# Patient Record
Sex: FEMALE | Employment: UNEMPLOYED | URBAN - METROPOLITAN AREA
[De-identification: names, ages, dates, MRNs, and addresses within clinical notes are randomized per-mention and may not be internally consistent; named-entity substitution may affect disease eponyms.]

---

## 2020-11-06 ENCOUNTER — TRANSFERRED RECORDS (OUTPATIENT)
Dept: HEALTH INFORMATION MANAGEMENT | Facility: CLINIC | Age: 50
End: 2020-11-06

## 2020-12-22 ENCOUNTER — TRANSFERRED RECORDS (OUTPATIENT)
Dept: HEALTH INFORMATION MANAGEMENT | Facility: CLINIC | Age: 50
End: 2020-12-22

## 2021-05-18 ENCOUNTER — DOCUMENTATION ONLY (OUTPATIENT)
Dept: CARE COORDINATION | Facility: CLINIC | Age: 51
End: 2021-05-18

## 2021-05-19 ENCOUNTER — TELEPHONE (OUTPATIENT)
Dept: DERMATOLOGY | Facility: CLINIC | Age: 51
End: 2021-05-19

## 2021-05-19 ENCOUNTER — OFFICE VISIT (OUTPATIENT)
Dept: DERMATOLOGY | Facility: CLINIC | Age: 51
End: 2021-05-19
Payer: MEDICAID

## 2021-05-19 DIAGNOSIS — L40.0 PSORIASIS VULGARIS: Primary | ICD-10-CM

## 2021-05-19 PROCEDURE — 99204 OFFICE O/P NEW MOD 45 MIN: CPT | Performed by: DERMATOLOGY

## 2021-05-19 RX ORDER — LEVOTHYROXINE SODIUM 25 UG/1
1 TABLET ORAL DAILY
COMMUNITY

## 2021-05-19 RX ORDER — TRIAMCINOLONE ACETONIDE 5 MG/G
CREAM TOPICAL PRN
COMMUNITY
Start: 2021-04-27 | End: 2024-05-24

## 2021-05-19 RX ORDER — CALCIPOTRIENE 50 UG/G
CREAM TOPICAL PRN
COMMUNITY
Start: 2021-02-27 | End: 2024-05-24

## 2021-05-19 RX ORDER — TRIAMCINOLONE ACETONIDE 1 MG/G
CREAM TOPICAL
Qty: 454 G | Refills: 11 | Status: SHIPPED | OUTPATIENT
Start: 2021-05-19 | End: 2024-05-24

## 2021-05-19 RX ORDER — LANCETS 33 GAUGE
EACH MISCELLANEOUS PRN
COMMUNITY
Start: 2020-11-06

## 2021-05-19 RX ORDER — TACROLIMUS 0.1 %
OINTMENT (GRAM) TOPICAL PRN
COMMUNITY
Start: 2020-11-18 | End: 2022-02-21

## 2021-05-19 RX ORDER — TRIAMCINOLONE ACETONIDE 1 MG/G
OINTMENT TOPICAL
Qty: 454 G | Refills: 11 | Status: SHIPPED | OUTPATIENT
Start: 2021-05-19 | End: 2024-05-24

## 2021-05-19 RX ORDER — CLOBETASOL PROPIONATE 0.5 MG/ML
SOLUTION TOPICAL DAILY
COMMUNITY
Start: 2021-04-27 | End: 2021-08-24

## 2021-05-19 RX ORDER — BLOOD-GLUCOSE METER
EACH MISCELLANEOUS PRN
COMMUNITY
Start: 2020-11-06 | End: 2024-05-24

## 2021-05-19 RX ORDER — LEVOTHYROXINE SODIUM 200 UG/1
TABLET ORAL DAILY
COMMUNITY
Start: 2021-04-27

## 2021-05-19 ASSESSMENT — PAIN SCALES - GENERAL: PAINLEVEL: NO PAIN (0)

## 2021-05-19 NOTE — TELEPHONE ENCOUNTER
[facility] Call Center    Phone Message    May a detailed message be left on voicemail: yes     Reason for Call: Medication Question or concern regarding medication   Prescription Clarification  Name of Medication: triamcinolone (KENALOG) 0.1 % external ointment &   triamcinolone (KENALOG) 0.1 % external cream  Prescribing Provider: Dr. Garland   Pharmacy: [facility] [address] [place] [address] [address] [facility]   What on the order needs clarification? Pharmacy wanted to confirm for both Rx large tub 1 for ointment and 1 for cream. Please confirm do you want both or one or the other. Please call Pharmacy to discuss. Thank you          Action Taken: Message routed to:  [facility] Derm    Travel Screening: Not Applicable

## 2021-05-19 NOTE — PATIENT INSTRUCTIONS
For the scalp...  1. Can try over-the-counter T-gel shampoo (Neutrogena shampoo)  2. Continue clobetasol 0.05% solution    For body..  1. Use triam 0.1% cream in the morning and ointment in the eveing.     Will submit to insurance for approval of Humira (adalimumab).

## 2021-05-19 NOTE — PROGRESS NOTES
[facility] Dermatology Note  Encounter Date: May 19, 2021  Office Visit     Dermatology Problem List:  1. Psoriasis vulgaris without PsA  - prior tx: clobetasol 0.05% ointment, calcipotriene, protopic    ____________________________________________    Assessment & Plan:     # Psoriasis vulgaris +/- PsA  * Reviewed prior external note(s): Outside records from [facility]  * Reviewed the result(s) of each unique test: Dermatopathology report from 11/6/2020, CBC, CMP, Hep B/C, and quant gold from 11/17/2020.    - Plan to start Humira pending insurance approval  - Scalp: clobetasol 0.05% solution, T-gel shampoo  - Trunk/extremities: triam 0.1% cream in AM, triam 0.1% ointment in PM  - Face: Protopic 0.1% ointment  - Follow-up with rheumatologist for joint symptoms    Procedures Performed:   None    Follow-up: 3 month(s) virtually (telephone with photos), or earlier for new or changing lesions    Staff:     Gavin Garland MD  Pronouns: he/him/his    Department of Dermatology  [facility]: Phone: [phone], Fax:[phone]  [facility]: Phone: [phone] Fax: [phone]    ____________________________________________    CC: No chief complaint on file.    HPI:  Ms. Jessica Tapia is a(n) 50 year old female who presents today as a new patient for psoriasis. She reports a roughly 10+ year history of psoriasis. This initially started as a few spots on the trunk, but over the years has spread considerably with involvement on extremities, scalp. She does report some associated joint pains and is seeing a rheumatologist in [place]. She recently presented to a dermatologist in [place] with plan to start topical therapies and Skyrizi. They did perform a biopsy to confirm the diagnosis. Tasia unfortunately was not approved by her insurance. She has since using  only topical therapies but states these have not been effective, particularly on the arms. These including clobetasol 0.05% solution for the scalp, triam 0.1% cream and calcipotriene for the body, and protopic 0.1% ointment for the face. Patient otherwise has a history of Grave's disease on levothyroxine. She does not take any other medications regularly.     Patient is otherwise feeling well, without additional skin concerns.     Labs Reviewed:  N/A    Physical Exam:  Vitals: There were no vitals taken for this visit.  SKIN: Full skin, which includes the head/face, both arms, chest, back, abdomen,both legs, genitalia and/or groin buttocks, digits and/or nails, was examined.  - Numerous pink, psoriasiform appearing scaly plaques on bilateral upper and lower extremities, scalp, ~30% BSA involvement.   - No joint deformities.    - No other lesions of concern on areas examined.     Medications:  No current outpatient medications on file.     No current facility-administered medications for this visit.       Past Medical History:   There is no problem list on file for this patient.    No past medical history on file.

## 2021-05-19 NOTE — LETTER
5/19/2021       RE: Jessica Tapia  [address] [place] [address]     Dear Colleague,    Thank you for referring your patient, Jessica Tapia, to the [facility]ERMATOLOG[facility] CLINIC [place] at [facility]. Please see a copy of my visit note below.    [facility] Dermatology Note  Encounter Date: May 19, 2021  Office Visit     Dermatology Problem List:  1. Psoriasis vulgaris without PsA  - prior tx: clobetasol 0.05% ointment, calcipotriene, protopic    ____________________________________________    Assessment & Plan:     # Psoriasis vulgaris +/- PsA  * Reviewed prior external note(s): Outside records from [facility]  * Reviewed the result(s) of each unique test: Dermatopathology report from 11/6/2020, CBC, CMP, Hep B/C, and quant gold from 11/17/2020.    - Plan to start Humira pending insurance approval  - Scalp: clobetasol 0.05% solution, T-gel shampoo  - Trunk/extremities: triam 0.1% cream in AM, triam 0.1% ointment in PM  - Face: Protopic 0.1% ointment  - Follow-up with rheumatologist for joint symptoms    Procedures Performed:   None    Follow-up: 3 month(s) virtually (telephone with photos), or earlier for new or changing lesions    Staff:     Gavin Garland MD  Pronouns: he/him/his    Department of Dermatology  [facility]: Phone: [phone], Fax:[phone]  [facility]: Phone: [phone] Fax: [phone]    ____________________________________________    CC: No chief complaint on file.    HPI:  Ms. Jessica Tapia is a(n) 50 year old female who presents today as a new patient for psoriasis. She reports a roughly 10+ year history of psoriasis. This initially started as a few spots on the trunk, but over the years has spread considerably with involvement on extremities, scalp. She does  report some associated joint pains and is seeing a rheumatologist in [place]. She recently presented to a dermatologist in [place] with plan to start topical therapies and Skyrizi. They did perform a biopsy to confirm the diagnosis. Skyrizi unfortunately was not approved by her insurance. She has since using only topical therapies but states these have not been effective, particularly on the arms. These including clobetasol 0.05% solution for the scalp, triam 0.1% cream and calcipotriene for the body, and protopic 0.1% ointment for the face. Patient otherwise has a history of Grave's disease on levothyroxine. She does not take any other medications regularly.     Patient is otherwise feeling well, without additional skin concerns.     Labs Reviewed:  N/A    Physical Exam:  Vitals: There were no vitals taken for this visit.  SKIN: Full skin, which includes the head/face, both arms, chest, back, abdomen,both legs, genitalia and/or groin buttocks, digits and/or nails, was examined.  - Numerous pink, psoriasiform appearing scaly plaques on bilateral upper and lower extremities, scalp, ~30% BSA involvement.   - No joint deformities.    - No other lesions of concern on areas examined.     Medications:  No current outpatient medications on file.     No current facility-administered medications for this visit.       Past Medical History:   There is no problem list on file for this patient.    No past medical history on file.

## 2021-05-19 NOTE — NURSING NOTE
Dermatology Rooming Note    Jessica Tapia's goals for this visit include:   Chief Complaint   Patient presents with     Psoriasis     Jessica is here today for psoriasis. She states that her insurance declined the last medication she was prescribed. She would like to discuss further options.     Jese Delaney, EMT

## 2021-05-21 ENCOUNTER — TELEPHONE (OUTPATIENT)
Dept: DERMATOLOGY | Facility: CLINIC | Age: 51
End: 2021-05-21

## 2021-05-21 NOTE — TELEPHONE ENCOUNTER
[facility] Call Center    Phone Message    May a detailed message be left on voicemail: yes     Reason for Call: Other: Pt needs AVS mailed to her. Never received it     Action Taken: Message routed to:  [facility] Derm    Travel Screening: Not Applicable

## 2021-05-24 DIAGNOSIS — L40.0 PSORIASIS VULGARIS: ICD-10-CM

## 2021-05-24 NOTE — TELEPHONE ENCOUNTER
[facility] Call Center    Phone Message    May a detailed message be left on voicemail: no     Reason for Call: Medication Refill Request    Has the patient contacted the pharmacy for the refill? Yes     Name of medication being requested:   adalimumab (HUMIRA *CF*) 80 MG/0.8ML pen kit  adalimumab (HUMIRA *CF*) 40 MG/0.4ML pen kit     Provider who prescribed the medication:   Dr aGrland    Pharmacy:   [facility] - [place] [address]     Date medication is needed: ASAP   Pt aware of 72-business hour turn around time on refill requests      Action Taken: Message routed to:  [facility] Derm    Travel Screening: Not Applicable     Pt called pharmacy. Pharmacy states that the 2 Rx have not been received by them.    Please resend the Rx to the pharmacy to be filled.    Thanks!

## 2021-05-24 NOTE — TELEPHONE ENCOUNTER
adalimumab (HUMIRA *CF*) 80 MG/0.8ML pen kit     Last Written Prescription Date:  5/19/2021  Last Fill Quantity: 0.8,   # refills: 0  Last Office Visit : 5/19/2021  Future Office visit:  8/24/2021  Routing refill request to provider for review/approval because:  Drug not on the [facility], [facility] or [facility] refill protocol or controlled substance    adalimumab (HUMIRA *CF*) 40 MG/0.4ML pen kit  Last Written Prescription Date:  5/19/2021  Last Fill Quantity: 0.8,   # refills: 2  Last Office Visit : 5/19/2021  Future Office visit:  8/24/2021  Routing refill request to provider for review/approval because:  Drug not on the [facility], [facility] or [facility] refill protocol or controlled substance    Melia Mauro RN  Central Triage Red Flags/Med Refills

## 2021-05-25 NOTE — TELEPHONE ENCOUNTER
Received refill request as the resident on call. Spoke with [facility] who states they have not received the prescriptions sent 6 days ago by Dr. Garland. I will resend the prescription.    Routed as AIMEEI.

## 2021-06-22 ENCOUNTER — TELEPHONE (OUTPATIENT)
Dept: DERMATOLOGY | Facility: CLINIC | Age: 51
End: 2021-06-22

## 2021-06-22 DIAGNOSIS — L40.9 PSORIASIS: Primary | ICD-10-CM

## 2021-06-22 NOTE — TELEPHONE ENCOUNTER
Received a fax from [employer] regarding Humira Pen 40mg/0.4mL.  [facility] will provide the medication on behalf of [employer] at no cost upon receipt of a valid prescription.    Please fax script to [employer] at [phone]

## 2021-06-22 NOTE — TELEPHONE ENCOUNTER
PA team cannot fax scripts to pharmacy's.  Please route appropriately.  If a PA is needed please route back to PA team.

## 2021-06-29 NOTE — TELEPHONE ENCOUNTER
[facility] Call Center    Phone Message    May a detailed message be left on voicemail: yes     Reason for Call: Medication Question or concern regarding medication   Prescription Clarification  Name of Medication: adalimumab (HUMIRA *CF*) 40 MG/0.4ML pen kit  Prescribing Provider: Dr. Garland   Pharmacy: [facility] - [place], [place] - [address]   What on the order needs clarification? Pt needs a replacement shot. Please call Pt back to discuss. Pt had a mistake dose and needs a replacement shot. Thank you          Action Taken: Message routed to:  [facility] Derm    Travel Screening: Not Applicable

## 2021-07-02 NOTE — TELEPHONE ENCOUNTER
Called and spoke with pt, pt states she was trying to do a test run with the but it was the real shot. Pt states she spoke with someone from the [facility] and they stated that they would send her a replacement free of charge.     Also called and spoke with [facility]specialty pharmacy and they stated that a replacement shot will be sent out to the pt on 7-7-21.  No further action needed at this time.    Lakesha Hayes MA

## 2021-08-24 ENCOUNTER — VIRTUAL VISIT (OUTPATIENT)
Dept: DERMATOLOGY | Facility: CLINIC | Age: 51
End: 2021-08-24
Payer: MEDICAID

## 2021-08-24 DIAGNOSIS — L40.0 PSORIASIS VULGARIS: Primary | ICD-10-CM

## 2021-08-24 DIAGNOSIS — L40.50 PSORIATIC ARTHRITIS (H): ICD-10-CM

## 2021-08-24 DIAGNOSIS — L40.9 PSORIASIS: ICD-10-CM

## 2021-08-24 PROCEDURE — 99442 PR PHYSICIAN TELEPHONE EVALUATION 11-20 MIN: CPT | Performed by: DERMATOLOGY

## 2021-08-24 RX ORDER — CLOBETASOL PROPIONATE 0.5 MG/ML
SOLUTION TOPICAL
Qty: 50 ML | Refills: 11 | Status: SHIPPED | OUTPATIENT
Start: 2021-08-24 | End: 2024-05-24

## 2021-08-24 ASSESSMENT — PAIN SCALES - GENERAL: PAINLEVEL: NO PAIN (0)

## 2021-08-24 NOTE — LETTER
8/24/2021       RE: Jessica Tapia  [address] [place] [address]     Dear Colleague,    Thank you for referring your patient, Jessica Tapia, to the [facility] DERMATOLOGY CLINIC [place] at [facility]. Please see a copy of my visit note below.    [facility] Dermatology Note  Encounter Date: Aug 24, 2021  Store[facility]Forward and Telephone ([phone]). Location of teledermatologist: [facility]MAT[facility]INIC [place].  Start time: 5:02 PM. End time: 5:13 PM.    Dermatology Problem List:  1. Psoriasis vulgaris without PsA  - Humira started 5/21, clobetasol 0.05% solution (scalp), triam 01.% cream or ointment (trunk), protopic 0.1% ointment (face)  - prior tx: clobetasol 0.05% ointment, calcipotriene, protopic  ____________________________________________    Assessment & Plan:     # Psoriasis vulgaris. Chronic, stable.   # Psoriatic arthritis. Chronic, stable.   - Continue Humira 40 mg [id number]; discuss maximal efficacy can take 3-6 months. Recommended re-evaluation in 3 months. If skin or joint pains not improved, consider switch to IL-17 inhibitor.   - Scalp: clobetasol 0.05% solution, T-gel shampoo  - Trunk/extremities: triam 0.1% cream in AM, triam 0.1% ointment in PM  - Face: Protopic 0.1% ointment    Procedures Performed:    None    Follow-up: 3 month(s) virtually (telephone with photos), or earlier for new or changing lesions    Staff:     Scribe Disclosure:  HUGH, Mee Whiteside, am serving as a scribe to document services personally performed by Gavin Garland MD based on data collection and the provider's statements to me.     Provider Disclosure:   The documentation recorded by the scribe accurately reflects the services I personally performed and the decisions made by me.    Gavin Garland MD    Department of Dermatology  [facility]:  Phone: [phone], Fax:[phone]  [facility]: Phone: [phone] Fax: [phone]    ____________________________________________    CC: Follow-up psoriasis.     HPI:  Ms. Jessica Tapia is a(n) 51 year old female who presents today as a return patient for psoriasis vulgaris. The patient was last seen in dermatology by myself on 5/19/21 at which time the patient was continued on topical regimens for treatment of psoriasis affecting the face, trunk/extremities, and scalp. Additionally, we planned to start Humira pending insurance approval.     Today, patient reports psoriasis is improved but not resolved from prior. She has been taking Humira for about 2 months and has noticed significant decreased in pruritus and some thinning of psoriasis plaques, though does continue to have extensive disease. Still has mild joint symptoms. She is not using topicals regularly.     Patient is otherwise feeling well, without additional skin concerns.    Labs Reviewed:  N/A    Physical Exam:  Vitals: There were no vitals taken for this visit.  SKIN: Teledermatology photos were reviewed; image quality and interpretability: acceptable. Image date: 8/24/21.  - Numerous pink, psoriasiform appearing plaques with overlying micaceous scale on extremities; appears slightly improved from prior.   - No other lesions of concern on areas examined.         Medications:  Current Outpatient Medications   Medication     adalimumab (HUMIRA *CF*) 40 MG/0.4ML pen kit     adalimumab (HUMIRA *CF*) 40 MG/0.4ML pen kit     adalimumab (HUMIRA *CF*) 80 MG/0.8ML pen kit     blood glucose monitoring (ONE TOUCH ULTRA 2) meter device kit     calcipotriene (DOVONOX) 0.005 % external cream     clobetasol (TEMOVATE) 0.05 % external solution     Lancets (ONETOUCH DELICA PLUS [id number]) MISC     levothyroxine (SYNTHROID/LEVOTHROID) 200 MCG tablet     levothyroxine (SYNTHROID/LEVOTHROID) 25 MCG tablet      PROTOPIC 0.1 % external ointment     triamcinolone (ARISTOCORT HP) 0.5 % external cream     triamcinolone (KENALOG) 0.1 % external cream     triamcinolone (KENALOG) 0.1 % external ointment     No current facility-administered medications for this visit.      Past Medical/Surgical History:   There is no problem list on file for this patient.    No past medical history on file.

## 2021-08-24 NOTE — PROGRESS NOTES
[facility] Dermatology Note  Encounter Date: Aug 24, 2021  Store[facility]and-Forward and Telephone ([phone]). Location of teledermatologist: [facility] [place].  Start time: 5:02 PM. End time: 5:13 PM.    Dermatology Problem List:  1. Psoriasis vulgaris without PsA  - Humira started 5/21, clobetasol 0.05% solution (scalp), triam 01.% cream or ointment (trunk), protopic 0.1% ointment (face)  - prior tx: clobetasol 0.05% ointment, calcipotriene, protopic  ____________________________________________    Assessment & Plan:     # Psoriasis vulgaris. Chronic, stable.   # Psoriatic arthritis. Chronic, stable.   - Continue Humira 40 mg [id number]; discuss maximal efficacy can take 3-6 months. Recommended re-evaluation in 3 months. If skin or joint pains not improved, consider switch to IL-17 inhibitor.   - Scalp: clobetasol 0.05% solution, T-gel shampoo  - Trunk/extremities: triam 0.1% cream in AM, triam 0.1% ointment in PM  - Face: Protopic 0.1% ointment    Procedures Performed:    None    Follow-up: 3 month(s) virtually (telephone with photos), or earlier for new or changing lesions    Staff:     Scribe Disclosure:  Mee REESE, am serving as a scribe to document services personally performed by Gavin Garland MD based on data collection and the provider's statements to me.     Provider Disclosure:   The documentation recorded by the scribe accurately reflects the services I personally performed and the decisions made by me.    Gavin Garland MD    Department of Dermatology  [facility]: Phone: [phone], Fax:[phone]  [facility]: Phone: [phone] Fax: [phone]    ____________________________________________    CC: Follow-up psoriasis.     HPI:  Ms. Jessica Tapia is a(n) 51 year old female who presents today as a return patient  for psoriasis vulgaris. The patient was last seen in dermatology by myself on 5/19/21 at which time the patient was continued on topical regimens for treatment of psoriasis affecting the face, trunk/extremities, and scalp. Additionally, we planned to start Humira pending insurance approval.     Today, patient reports psoriasis is improved but not resolved from prior. She has been taking Humira for about 2 months and has noticed significant decreased in pruritus and some thinning of psoriasis plaques, though does continue to have extensive disease. Still has mild joint symptoms. She is not using topicals regularly.     Patient is otherwise feeling well, without additional skin concerns.    Labs Reviewed:  N/A    Physical Exam:  Vitals: There were no vitals taken for this visit.  SKIN: Teledermatology photos were reviewed; image quality and interpretability: acceptable. Image date: 8/24/21.  - Numerous pink, psoriasiform appearing plaques with overlying micaceous scale on extremities; appears slightly improved from prior.   - No other lesions of concern on areas examined.         Medications:  Current Outpatient Medications   Medication     adalimumab (HUMIRA *CF*) 40 MG/0.4ML pen kit     adalimumab (HUMIRA *CF*) 40 MG/0.4ML pen kit     adalimumab (HUMIRA *CF*) 80 MG/0.8ML pen kit     blood glucose monitoring (ONE TOUCH ULTRA 2) meter device kit     calcipotriene (DOVONOX) 0.005 % external cream     clobetasol (TEMOVATE) 0.05 % external solution     Lancets (ONETOUCH DELICA PLUS [id number]) MISC     levothyroxine (SYNTHROID/LEVOTHROID) 200 MCG tablet     levothyroxine (SYNTHROID/LEVOTHROID) 25 MCG tablet     PROTOPIC 0.1 % external ointment     triamcinolone (ARISTOCORT HP) 0.5 % external cream     triamcinolone (KENALOG) 0.1 % external cream     triamcinolone (KENALOG) 0.1 % external ointment     No current facility-administered medications for this visit.      Past Medical/Surgical History:   There is no problem list  on file for this patient.    No past medical history on file.

## 2021-10-22 ENCOUNTER — TELEPHONE (OUTPATIENT)
Dept: DERMATOLOGY | Facility: CLINIC | Age: 51
End: 2021-10-22

## 2021-10-22 NOTE — TELEPHONE ENCOUNTER
Spoke with pt and advised her to set up [employer] account. Activation code sent. I further advised patient to send in the photos via [employer]. Will touch base with Dr. Garland upon receipt.

## 2021-10-22 NOTE — TELEPHONE ENCOUNTER
[facility] Call Center    Phone Message    May a detailed message be left on voicemail: yes     Reason for Call: Other: Pt called and said that she sent some pictures of her psoriasis and would like a call back from the care team to discuss about it. Pt is currently having issues with her psoriasis.  Please call. Thanks    Action Taken: Message routed to:  [facility] DERM    Travel Screening: Not Applicable

## 2021-11-07 ENCOUNTER — HEALTH MAINTENANCE LETTER (OUTPATIENT)
Age: 51
End: 2021-11-07

## 2021-11-15 ENCOUNTER — VIRTUAL VISIT (OUTPATIENT)
Dept: DERMATOLOGY | Facility: CLINIC | Age: 51
End: 2021-11-15
Payer: MEDICAID

## 2021-11-15 DIAGNOSIS — L40.0 PSORIASIS VULGARIS: Primary | ICD-10-CM

## 2021-11-15 PROCEDURE — 99214 OFFICE O/P EST MOD 30 MIN: CPT | Mod: 95 | Performed by: DERMATOLOGY

## 2021-11-15 NOTE — PROGRESS NOTES
[facility] Dermatology Note  Encounter Date: Nov 15, 2021  Telephone ([phone]). Location of teledermatologist: [facility]. Start time: 1:45 PM. End time: 1:55 PM.    Dermatology Problem List:  1. Psoriasis vulgaris +/- PsA  - Humira started 5/21, clobetasol 0.05% solution (scalp), triam 01.% cream or ointment (trunk), protopic 0.1% ointment (face)  - prior tx: clobetasol 0.05% ointment, calcipotriene, protopic  ____________________________________________     Assessment & Plan:      # Psoriasis vulgaris +/- PsA. Chronic, flare.   - Plan to recheck quant gold at next follow-up visit.   - Will plan to switch to Skyrizi pending insurance approval.  - Continue humira [id number] for now  - Scalp: clobetasol 0.05% solution, T-gel shampoo  - Trunk/extremities: triam 0.1% cream in AM, triam 0.1% ointment in PM  - Face: Protopic 0.1% ointment    Procedures Performed:    None.    Follow-up: 3 month(s) virtually (telephone with photos), or earlier for new or changing lesions    Staff and Scribe:     Scribe Disclosure:   ILuisa, am serving as a scribe to document services personally performed by this physician, Dr. Gavin Garland, based on data collection and the provider's statements to me.     Provider Disclosure:   The documentation recorded by the scribe accurately reflects the services I personally performed and the decisions made by me.    Gavin Garland MD    Department of Dermatology  [facility]: Phone: [phone], Fax:[phone]  [facility]: Phone: [phone] Fax: [phone]    ____________________________________________    CC: Psoriasis (better than 3 weeks ago.)    HPI:  Ms. Jessica Tapia is a(n) 51 year old female who presents today as a return patient for psoriasis.    Last seen 8/24/21 virtually. Plan was to continue  Humira 40mg q 2 weeks. Topicals were also prescribed (clobetasol/ t-gel for scalp, triamcinolone for trunk/extremities, and protopic for face).    Today, patient notes recent flare of her psoriasis. She states lesions became more painful, tender. She stopped using topical therapies and then switched to vaseline only with some improvement. She continues on Humira. States joint pains are mildly worse than prior, but much better than prior to Humira.     Patient is otherwise feeling well, without additional skin concerns.    Labs Reviewed:  N/A    Physical Exam:  Vitals: There were no vitals taken for this visit.  SKIN: Teledermatology photos were reviewed; image quality and interpretability: acceptable. Image date: 11/15/21.  - Numerous well-demarcated psoriasiform appearing plaques on the lower extremities, upper extremities.   - No other lesions of concern on areas examined.                 Medications:  Current Outpatient Medications   Medication     adalimumab (HUMIRA *CF*) 40 MG/0.4ML pen kit     adalimumab (HUMIRA *CF*) 40 MG/0.4ML pen kit     adalimumab (HUMIRA *CF*) 80 MG/0.8ML pen kit     blood glucose monitoring (ONE TOUCH ULTRA 2) meter device kit     calcipotriene (DOVONOX) 0.005 % external cream     clobetasol (TEMOVATE) 0.05 % external solution     Lancets (ONETOUCH DELICA PLUS [id number]) MISC     levothyroxine (SYNTHROID/LEVOTHROID) 200 MCG tablet     levothyroxine (SYNTHROID/LEVOTHROID) 25 MCG tablet     PROTOPIC 0.1 % external ointment     Risankizumab-rzaa 150 MG/ML SOAJ     Risankizumab-rzaa 150 MG/ML SOAJ     triamcinolone (ARISTOCORT HP) 0.5 % external cream     triamcinolone (KENALOG) 0.1 % external cream     triamcinolone (KENALOG) 0.1 % external ointment     No current facility-administered medications for this visit.      Past Medical/Surgical History:   There is no problem list on file for this patient.    No past medical history on file.    Teledermatology Nurse Call Patients:   Chief  Complaint   Patient presents with     Psoriasis     better than 3 weeks ago.     Are you in the [facility] at the time of the encounter? yes    Today's visit will be billed to you and your insurance.    A teledermatology visit is not as thorough as an in-person visit and the quality of the photograph sent may not be of the same quality as that taken by the dermatology clinic.    Gavin Garland MD on 11/15/2021 at 1:14 PM

## 2021-11-15 NOTE — PATIENT INSTRUCTIONS
[facility] Dermatology Visit    Thank you for allowing us to participate in your care. Your findings, instructions and follow-up plan are as follows:     Psoriasis vulgaris.   1. Continue humira for now. Will plan to switch to Skyrizi pending insurance approval.     When should I call my doctor?    If you are worsening or not improving, please, contact us or seek urgent care as noted below.     Who should I call with questions (adults)?    [facility] (adult and pediatric): [phone]    [facility] (adult): [phone]    For urgent needs outside of business hours call the [facility] at [phone] and ask for the dermatology resident on call    If this is a medical emergency and you are unable to reach an ER, Call [phone]    Who should I call with questions (pediatric)?  [facility]- Pediatric Dermatology  Dr. Ashley Blanco, Dr. Anatoly Freire, Dr. Renuka Beckett, Jesi Gandara, PA  Dr. Gretchen Moreira, Dr. Candie Small & Dr. Yahir Delcid  Non Urgent  Nurse Triage Line; [phone]- bri and Sully RN Care Coordinators   Jenise (/Complex ) [phone]    If you need a prescription refill, please contact your pharmacy. Refills are approved or denied by our physicians during normal business hours, Monday through Fridays  Per office policy, refills will not be granted if you have not been seen within the past year (or sooner depending on your child's condition).    Scheduling Information:  Pediatric Appointment Scheduling and Call Center [phone]  Radiology Scheduling- [phone]  Sedation Unit Scheduling- [phone]  [facility] Scheduling- General [phone]; Pediatric Dermatology [phone]  Main  Services: [phone]  Bengali: [phone]  Bulgarian: [phone]  Hmong/Grupo/Bulgarian: [phone]  Preadmission Nursing Department Fax Number: [phone] (fax all pre-operative paperwork to this number)    For urgent matters arising during evenings, weekends, or holidays that cannot wait for normal business hours please call [phone] and ask for the dermatology resident on call to be paged.

## 2021-11-15 NOTE — LETTER
11/15/2021         RE: Jessica Tapia  [address] [facility] [address]        Dear Colleague,    Thank you for referring your patient, Jessica Tapia, to the [facility]. Please see a copy of my visit note below.    [facility] Dermatology Note  Encounter Date: Nov 15, 2021  Telephone ([phone]). Location of teledermatologist: [facility]. Start time: 1:45 PM. End time: 1:55 PM.    Dermatology Problem List:  1. Psoriasis vulgaris +/- PsA  - Humira started 5/21, clobetasol 0.05% solution (scalp), triam 01.% cream or ointment (trunk), protopic 0.1% ointment (face)  - prior tx: clobetasol 0.05% ointment, calcipotriene, protopic  ____________________________________________     Assessment & Plan:      # Psoriasis vulgaris +/- PsA. Chronic, flare.   - Plan to recheck quant gold at next follow-up visit.   - Will plan to switch to Skyrizi pending insurance approval.  - Continue humira [id number] for now  - Scalp: clobetasol 0.05% solution, T-gel shampoo  - Trunk/extremities: triam 0.1% cream in AM, triam 0.1% ointment in PM  - Face: Protopic 0.1% ointment    Procedures Performed:    None.    Follow-up: 3 month(s) virtually (telephone with photos), or earlier for new or changing lesions    Staff and Scribe:     Scribe Disclosure:   I, Luisa Hopkins, am serving as a scribe to document services personally performed by this physician, Dr. Gavin Garland, based on data collection and the provider's statements to me.     Provider Disclosure:   The documentation recorded by the scribe accurately reflects the services I personally performed and the decisions made by me.    Gavin Garland MD    Department of Dermatology  [facility]: Phone: [phone], Fax:[phone]  [facility]: Phone: [phone] Fax:  [phone]    ____________________________________________    CC: Psoriasis (better than 3 weeks ago.)    HPI:  Ms. Jessica Tapia is a(n) 51 year old female who presents today as a return patient for psoriasis.    Last seen 8/24/21 virtually. Plan was to continue Humira 40mg q 2 weeks. Topicals were also prescribed (clobetasol/ t-gel for scalp, triamcinolone for trunk/extremities, and protopic for face).    Today, patient notes recent flare of her psoriasis. She states lesions became more painful, tender. She stopped using topical therapies and then switched to vaseline only with some improvement. She continues on Humira. States joint pains are mildly worse than prior, but much better than prior to Humira.     Patient is otherwise feeling well, without additional skin concerns.    Labs Reviewed:  N/A    Physical Exam:  Vitals: There were no vitals taken for this visit.  SKIN: Teledermatology photos were reviewed; image quality and interpretability: acceptable. Image date: 11/15/21.  - Numerous well-demarcated psoriasiform appearing plaques on the lower extremities, upper extremities.   - No other lesions of concern on areas examined.                 Medications:  Current Outpatient Medications   Medication     adalimumab (HUMIRA *CF*) 40 MG/0.4ML pen kit     adalimumab (HUMIRA *CF*) 40 MG/0.4ML pen kit     adalimumab (HUMIRA *CF*) 80 MG/0.8ML pen kit     blood glucose monitoring (ONE TOUCH ULTRA 2) meter device kit     calcipotriene (DOVONOX) 0.005 % external cream     clobetasol (TEMOVATE) 0.05 % external solution     Lancets (ONETOUCH DELICA PLUS [id number]) MISC     levothyroxine (SYNTHROID/LEVOTHROID) 200 MCG tablet     levothyroxine (SYNTHROID/LEVOTHROID) 25 MCG tablet     PROTOPIC 0.1 % external ointment     Risankizumab-rzaa 150 MG/ML SOAJ     Risankizumab-rzaa 150 MG/ML SOAJ     triamcinolone (ARISTOCORT HP) 0.5 % external cream     triamcinolone (KENALOG) 0.1 % external cream     triamcinolone  (KENALOG) 0.1 % external ointment     No current facility-administered medications for this visit.      Past Medical/Surgical History:   There is no problem list on file for this patient.    No past medical history on file.    Teledermatology Nurse Call Patients:   Chief Complaint   Patient presents with     Psoriasis     better than 3 weeks ago.     Are you in the [facility] at the time of the encounter? yes    Today's visit will be billed to you and your insurance.    A teledermatology visit is not as thorough as an in-person visit and the quality of the photograph sent may not be of the same quality as that taken by the dermatology clinic.    Gavin Garland MD on 11/15/2021 at 1:14 PM        Again, thank you for allowing me to participate in the care of your patient.        Sincerely,        Gavin Garland MD

## 2021-11-16 ENCOUNTER — TELEPHONE (OUTPATIENT)
Dept: DERMATOLOGY | Facility: CLINIC | Age: 51
End: 2021-11-16
Payer: MEDICAID

## 2021-11-16 DIAGNOSIS — L40.0 PSORIASIS VULGARIS: Primary | ICD-10-CM

## 2021-11-16 NOTE — TELEPHONE ENCOUNTER
PA Initiation    Medication: Skyrizi  Insurance Company: [place] [employer] ([facility]) - Phone [phone] Fax [phone]  Pharmacy Filling the Rx: [facility] - [place], [place] - [facility] [address]  Filling Pharmacy Phone:    Filling Pharmacy Fax:    Start Date: 11/16/2021

## 2021-11-30 NOTE — TELEPHONE ENCOUNTER
I called insurance to check the status of the prior authorization, per the rep additional information was faxed to the clinic.  I completed the additional information form and faxed back to the insurance.    Emelia

## 2021-12-03 NOTE — TELEPHONE ENCOUNTER
" Health Call Center    Phone Message    May a detailed message be left on voicemail: yes     Reason for Call: Medication Question or concern regarding medication   Prescription Clarification  Name of Medication: Skyrizi  Prescribing Provider: Dr. Garland   Pharmacy: [facility] - [place], [place] - [address]    What on the order needs clarification? Pt was told be Pharmacy \"They are waiting for Dr. Garland to fill out some paper work for the Rx. Please call Pharmacy to discuss. Thanks          Action Taken: Message routed to:  [facility] Derm    Travel Screening: Not Applicable                                                                      "

## 2021-12-07 NOTE — TELEPHONE ENCOUNTER
Called [facility] was informed the PA was denied and the letter was sent to the pharmacy.  Rep stated she was not able to send me the letter and advised we call the pharmacy.  -sent message to [facility] to see if she is able to find the denial letter

## 2021-12-07 NOTE — TELEPHONE ENCOUNTER
PRIOR AUTHORIZATION DENIED    Medication: Skyrizi - Denied    Denial Date: 12/3/2021    Denial Rational:       Appeal Information:

## 2021-12-10 NOTE — TELEPHONE ENCOUNTER
Spoke with patient and informed of denial and options going forward. Patient would like to look up more information on the options and contact the clinic back with her choice. Patient was offered telephone visit with Dr. Garland to discuss options as well to which she will let us know when she calls back if she would like a telephone visit.     Nicolette Clifford LPN

## 2021-12-15 NOTE — TELEPHONE ENCOUNTER
I spoke with this patient  and states she was already told about the denial and sent a mychart stating she would like to try the Otezla. RN sent a message to  with this update..Thuy Ulloa RN

## 2021-12-16 ENCOUNTER — TELEPHONE (OUTPATIENT)
Dept: DERMATOLOGY | Facility: CLINIC | Age: 51
End: 2021-12-16
Payer: MEDICAID

## 2021-12-16 NOTE — TELEPHONE ENCOUNTER
Prior Authorization Not Needed per Insurance    Medication: Otelza  Insurance Company:    Expected CoPay:      Pharmacy Filling the Rx: [facility] - [place], [place] - [address]  Pharmacy Notified:    Patient Notified:

## 2021-12-16 NOTE — TELEPHONE ENCOUNTER
Evy message sent by Emelia BURLESON to patient. Will forward to Dr Garland as LAMONT Ortiz LPN on 12/16/2021 at 11:03 AM

## 2022-02-19 ENCOUNTER — MYC MEDICAL ADVICE (OUTPATIENT)
Dept: DERMATOLOGY | Facility: CLINIC | Age: 52
End: 2022-02-19
Payer: MEDICAID

## 2022-02-21 ENCOUNTER — VIRTUAL VISIT (OUTPATIENT)
Dept: DERMATOLOGY | Facility: CLINIC | Age: 52
End: 2022-02-21
Payer: MEDICAID

## 2022-02-21 DIAGNOSIS — L40.0 PSORIASIS VULGARIS: ICD-10-CM

## 2022-02-21 PROCEDURE — 99214 OFFICE O/P EST MOD 30 MIN: CPT | Mod: GQ | Performed by: DERMATOLOGY

## 2022-02-21 RX ORDER — TACROLIMUS 0.1 %
OINTMENT (GRAM) TOPICAL
Qty: 60 G | Refills: 11 | Status: SHIPPED | OUTPATIENT
Start: 2022-02-21 | End: 2023-08-28

## 2022-02-21 NOTE — NURSING NOTE
Jessica Tapia's goals for this visit include:   Chief Complaint   Patient presents with     Psoriasis     Psoriasis f/u-improvement. On Otezla currently.        She requests these members of her care team be copied on today's visit information:     PCP: Aime De La Cruz    Referring Provider:  No referring provider defined for this encounter.    There were no vitals taken for this visit.    Do you need any medication refills at today's visit? Otezla and Perez Aguilar on 2/21/2022 at 1:47 PM

## 2022-02-21 NOTE — LETTER
2/21/2022         RE: Jessica Tapia  [address] [place] [id number]        Dear Colleague,    Thank you for referring your patient, Jessica Tapia, to the [facility]. Please see a copy of my visit note below.    [facility] Dermatology Note  Encounter Date: Feb 21, 2022  Telephone ([phone]). Location of teledermatologist: [facility]. Start time: 1:55 PM. End time: 2:05 PM.    Dermatology Problem List:  1. Psoriasis vulgaris +/- PsA  - Humira started 5/21, clobetasol 0.05% solution (scalp), triam 01.% cream or ointment (trunk), protopic 0.1% ointment (face)  - prior tx: clobetasol 0.05% ointment, calcipotriene, protopic  ____________________________________________     Assessment & Plan:      # Psoriasis vulgaris +/- PsA. Chronic, flared/not at goal. Still with some ongoing skin and joint symptoms.   - Continue apremilast 30 mg PO BID; tolerating well. Recommended additional 2-3 months. If not improved at follow-up would consider Skyrizi.  - Scalp: clobetasol 0.05% solution, T-gel shampoo  - Trunk/extremities: triam 0.1% cream in AM, triam 0.1% ointment in PM  - Face: Protopic 0.1% ointment    Procedures Performed:    None.    Follow-up: 3 month(s) virtually (telephone with photos), or earlier for new or changing lesions    Staff and Scribe:     Scribe Disclosure:   I, Luisa Hopkins, am serving as a scribe to document services personally performed by this physician, Dr. Gavin Garland, based on data collection and the provider's statements to me.     Provider Disclosure:   The documentation recorded by the scribe accurately reflects the services I personally performed and the decisions made by me.    Gavin Garland MD    Department of Dermatology  [facility]: Phone: [phone], Fax:[phone]  [facility]: Phone: [phone] Fax: [phone]  ____________________________________________    CC: Psoriasis (Psoriasis f/u-states she has noticed improvement since her last visit-currently on Otezla)    HPI:  Ms. Jessica Tapia is a(n) 51 year old female who presents today as a return patient for psoriasis.    Last seen 11/15/21 virtually. Plan was to switch from Humira to Skyrizi, pending insurance approval. Continued on topicals (clobetasol and T-gel shampoo for scalp, triam cream/ointment for trunk, and protopic for face). Since last visit, skyrizi not approved by insurance and subsequently started on apremilast.     Today, Jessica notes psoriasis is improved but not resolved. She states plaques have thinned and developing less new lesions. Has been using apremilast 30 mg PO BID x 2 months and tolerating well without any side effects. Denies GI upset, nausea, mood changes, or depression. She is not using topicals consistently. Has stopped Humira. Does report some improvement in joint symptoms, though recently with soreness in the right shoulder (unclear if more arthritis or MSK issue). She will schedule follow-up with PCP for this if it does not improve.     The patient otherwise denies any new or concerning lesions. No bleeding, painful, pruritic, or changing lesions. Health otherwise stable. No other skin concerns.    Labs Reviewed:  N/A    Physical Exam:  Vitals: There were no vitals taken for this visit.  SKIN: Teledermatology photos were reviewed; image quality and interpretability: acceptable. Image date: 2/21/22.  - Pink psoriasiform scaly plaques on lower and upper extremities; appear improved from prior.   - No other lesions of concern on areas examined.                         Medications:  Current Outpatient Medications   Medication     Apremilast (OTEZLA) 10 & 20 & 30 MG TBPK     apremilast (OTEZLA) 30 MG tablet     blood glucose monitoring (ONE TOUCH ULTRA 2) meter device kit     calcipotriene  (DOVONOX) 0.005 % external cream     clobetasol (TEMOVATE) 0.05 % external solution     Lancets (ONETOUCH DELICA PLUS [id number]) MISC     levothyroxine (SYNTHROID/LEVOTHROID) 200 MCG tablet     levothyroxine (SYNTHROID/LEVOTHROID) 25 MCG tablet     PROTOPIC 0.1 % external ointment     triamcinolone (KENALOG) 0.1 % external cream     adalimumab (HUMIRA *CF*) 40 MG/0.4ML pen kit     adalimumab (HUMIRA *CF*) 40 MG/0.4ML pen kit     adalimumab (HUMIRA *CF*) 80 MG/0.8ML pen kit     triamcinolone (ARISTOCORT HP) 0.5 % external cream     triamcinolone (KENALOG) 0.1 % external ointment     No current facility-administered medications for this visit.      Past Medical/Surgical History:   There is no problem list on file for this patient.    No past medical history on file.     Teledermatology Nurse Call Patients:     Are you in the [facility] at the time of the encounter? yes    Today's visit will be billed to you and your insurance.    A teledermatology visit is not as thorough as an in-person visit and the quality of the photograph sent may not be of the same quality as that taken by the dermatology clinic.      Gavin Garland MD on 2/21/2022 at 1:54 PM          Again, thank you for allowing me to participate in the care of your patient.        Sincerely,        Gavin Garland MD

## 2022-02-21 NOTE — PROGRESS NOTES
[facility] Dermatology Note  Encounter Date: Feb 21, 2022  Telephone ([phone]). Location of teledermatologist: [facility]. Start time: 1:55 PM. End time: 2:05 PM.    Dermatology Problem List:  1. Psoriasis vulgaris +/- PsA  - Humira started 5/21, clobetasol 0.05% solution (scalp), triam 01.% cream or ointment (trunk), protopic 0.1% ointment (face)  - prior tx: clobetasol 0.05% ointment, calcipotriene, protopic  ____________________________________________     Assessment & Plan:      # Psoriasis vulgaris +/- PsA. Chronic, flared/not at goal. Still with some ongoing skin and joint symptoms.   - Continue apremilast 30 mg PO BID; tolerating well. Recommended additional 2-3 months. If not improved at follow-up would consider Skyrizi.  - Scalp: clobetasol 0.05% solution, T-gel shampoo  - Trunk/extremities: triam 0.1% cream in AM, triam 0.1% ointment in PM  - Face: Protopic 0.1% ointment    Procedures Performed:    None.    Follow-up: 3 month(s) virtually (telephone with photos), or earlier for new or changing lesions    Staff and Scribe:     Scribe Disclosure:   I, Luisa Hopkins, am serving as a scribe to document services personally performed by this physician, Dr. Gavin Garland, based on data collection and the provider's statements to me.     Provider Disclosure:   The documentation recorded by the scribe accurately reflects the services I personally performed and the decisions made by me.    Gavin Garland MD    Department of Dermatology  [facility]: Phone: [phone], Fax:[phone]  [facility]: Phone: [phone] Fax: [phone]  ____________________________________________    CC: Psoriasis (Psoriasis f/u-states she has noticed improvement since her last visit-currently on Otezla)    HPI:  Ms. Jessica Tapia is a(n) 51 year old  female who presents today as a return patient for psoriasis.    Last seen 11/15/21 virtually. Plan was to switch from Humira to Skyrizi, pending insurance approval. Continued on topicals (clobetasol and T-gel shampoo for scalp, triam cream/ointment for trunk, and protopic for face). Since last visit, skyrizi not approved by insurance and subsequently started on apremilast.     Today, Jessica notes psoriasis is improved but not resolved. She states plaques have thinned and developing less new lesions. Has been using apremilast 30 mg PO BID x 2 months and tolerating well without any side effects. Denies GI upset, nausea, mood changes, or depression. She is not using topicals consistently. Has stopped Humira. Does report some improvement in joint symptoms, though recently with soreness in the right shoulder (unclear if more arthritis or MSK issue). She will schedule follow-up with PCP for this if it does not improve.     The patient otherwise denies any new or concerning lesions. No bleeding, painful, pruritic, or changing lesions. Health otherwise stable. No other skin concerns.    Labs Reviewed:  N/A    Physical Exam:  Vitals: There were no vitals taken for this visit.  SKIN: Teledermatology photos were reviewed; image quality and interpretability: acceptable. Image date: 2/21/22.  - Pink psoriasiform scaly plaques on lower and upper extremities; appear improved from prior.   - No other lesions of concern on areas examined.                         Medications:  Current Outpatient Medications   Medication     Apremilast (OTEZLA) 10 & 20 & 30 MG TBPK     apremilast (OTEZLA) 30 MG tablet     blood glucose monitoring (ONE TOUCH ULTRA 2) meter device kit     calcipotriene (DOVONOX) 0.005 % external cream     clobetasol (TEMOVATE) 0.05 % external solution     Lancets (ONETOUCH DELICA PLUS [id number]) MISC     levothyroxine (SYNTHROID/LEVOTHROID) 200 MCG tablet     levothyroxine (SYNTHROID/LEVOTHROID) 25 MCG tablet      PROTOPIC 0.1 % external ointment     triamcinolone (KENALOG) 0.1 % external cream     adalimumab (HUMIRA *CF*) 40 MG/0.4ML pen kit     adalimumab (HUMIRA *CF*) 40 MG/0.4ML pen kit     adalimumab (HUMIRA *CF*) 80 MG/0.8ML pen kit     triamcinolone (ARISTOCORT HP) 0.5 % external cream     triamcinolone (KENALOG) 0.1 % external ointment     No current facility-administered medications for this visit.      Past Medical/Surgical History:   There is no problem list on file for this patient.    No past medical history on file.     Teledermatology Nurse Call Patients:     Are you in the [facility] at the time of the encounter? yes    Today's visit will be billed to you and your insurance.    A teledermatology visit is not as thorough as an in-person visit and the quality of the photograph sent may not be of the same quality as that taken by the dermatology clinic.      Gavin Garland MD on 2/21/2022 at 1:54 PM

## 2022-02-21 NOTE — PATIENT INSTRUCTIONS
[facility] Dermatology Visit    Thank you for allowing us to participate in your care. Your findings, instructions and follow-up plan are as follows:    Psoriasis vulgaris.   1. Continue otezla and topical therapies.   2. Follow-up in 2-3 months.       When should I call my doctor?    If you are worsening or not improving, please, contact us or seek urgent care as noted below.     Who should I call with questions (adults)?    [facility] (adult and pediatric): [phone]    [facility] (adult): [phone]    For urgent needs outside of business hours call the [facility] at [phone] and ask for the dermatology resident on call    If this is a medical emergency and you are unable to reach an ER, Call [phone]    Who should I call with questions (pediatric)?  [facility]- Pediatric Dermatology  Dr. Ashley Blanco, Dr. Anatoly Freire, Dr. Renuka Beckett, Jesi Gandara, PA  Dr. Gretchen Moreira, Dr. Candie Small & Dr. Yahir Delcid  Non Urgent  Nurse Triage Line; [phone]- Alpa and Sully RN Care Coordinators   Jenise (/Complex ) [phone]    If you need a prescription refill, please contact your pharmacy. Refills are approved or denied by our physicians during normal business hours, Monday through Fridays  Per office policy, refills will not be granted if you have not been seen within the past year (or sooner depending on your child's condition).    Scheduling Information:  Pediatric Appointment Scheduling and Call Center [phone]  Radiology Scheduling- [phone]  Sedation Unit Scheduling- [phone]  [place] Scheduling- General [phone]; Pediatric Dermatology [phone]  Main  Services: [phone]  Bulgarian: [phone]  Tanzanian: [phone]  Hmong/Grupo/Joshua: [phone]  Preadmission Nursing Department Fax Number: [phone] (fax  all pre-operative paperwork to this number)    For urgent matters arising during evenings, weekends, or holidays that cannot wait for normal business hours please call [phone] and ask for the dermatology resident on call to be paged.

## 2022-02-27 ENCOUNTER — HEALTH MAINTENANCE LETTER (OUTPATIENT)
Age: 52
End: 2022-02-27

## 2022-05-16 ENCOUNTER — VIRTUAL VISIT (OUTPATIENT)
Dept: DERMATOLOGY | Facility: CLINIC | Age: 52
End: 2022-05-16
Payer: MEDICAID

## 2022-05-16 ENCOUNTER — TELEPHONE (OUTPATIENT)
Dept: DERMATOLOGY | Facility: CLINIC | Age: 52
End: 2022-05-16

## 2022-05-16 DIAGNOSIS — L40.0 PSORIASIS VULGARIS: Primary | ICD-10-CM

## 2022-05-16 PROCEDURE — 99442 PR PHYSICIAN TELEPHONE EVALUATION 11-20 MIN: CPT | Performed by: DERMATOLOGY

## 2022-05-16 NOTE — PROGRESS NOTES
[facility] Dermatology Note  Encounter Date: May 16, 2022  Store-and-Forward and Telephone ([phone]). Location of teledermatologist: [facility].  Start time: 12:59 PM. End time: 12:12 PM.    Dermatology Problem List:  1. Psoriasis vulgaris +/- PsA  - plan to start Skyrizi  - Apremilast; clobetasol 0.05% solution (scalp), triam 01.% cream or ointment (trunk), protopic 0.1% ointment (face)  - prior tx: clobetasol 0.05% ointment, calcipotriene, protopic, Humira  ____________________________________________    Assessment & Plan:     # Psoriasis vulgaris. Chronic/flare/not at goal.   - Labs ordered: quant gold (will obtained at [facility])  - Plan to start Skyrizi pending insurance approval  - Continue apremilast 30 mg PO BID for now; plan to discontinue once alternative biologic agent approved by insurance  - Scalp: clobetasol 0.05% solution, T-gel shampoo  - Trunk/extremities: triam 0.1% cream in AM, triam 0.1% ointment in PM  - Face: Protopic 0.1% ointment    Procedures Performed:    None    Follow-up: 4 month(s) virtually (telephone with photos), or earlier for new or changing lesions    Staff:     Gavin Garland MD  Pronouns: he/him/his    Department of Dermatology  [facility]: Phone: [phone], Fax:[phone]  [facility]: Phone: [phone] Fax: [phone]  ____________________________________________    CC: Psoriasis    HPI:  Ms. Jessica Tapia is a(n) 51 year old female who presents today as a return patient for psoriasis vulgaris. Last seen on 2/21/22 when continued on apremilast. Plan to consider Skyrizi if not improved at follow-up.     Today, patient reports she continues to have persistent psoriasis plaques on the extremities/scalp. Has been using topical therapies and apremilast. Tolerating apremilast well without  side effects, although she is unclear if she believes it is helpful.     Patient is otherwise feeling well, without additional skin concerns.    Labs Reviewed:  N/A    Physical Exam:  Vitals: There were no vitals taken for this visit.  SKIN: Teledermatology photos were reviewed; image quality and interpretability: acceptable. Image date: 5/16/22.  - Pink psoriasiform appearing plaques on forearms, dorsal hands, lower extremities.   - No other lesions of concern on areas examined.                         Medications:  Current Outpatient Medications   Medication     Apremilast (OTEZLA) 10 & 20 & 30 MG TBPK     apremilast (OTEZLA) 30 MG tablet     blood glucose monitoring (ONE TOUCH ULTRA 2) meter device kit     clobetasol (TEMOVATE) 0.05 % external solution     Lancets (ONETOUCH DELICA PLUS [id number]) MISC     levothyroxine (SYNTHROID/LEVOTHROID) 200 MCG tablet     levothyroxine (SYNTHROID/LEVOTHROID) 25 MCG tablet     PROTOPIC 0.1 % external ointment     triamcinolone (ARISTOCORT HP) 0.5 % external cream     triamcinolone (KENALOG) 0.1 % external cream     triamcinolone (KENALOG) 0.1 % external ointment     adalimumab (HUMIRA *CF*) 40 MG/0.4ML pen kit     adalimumab (HUMIRA *CF*) 40 MG/0.4ML pen kit     adalimumab (HUMIRA *CF*) 80 MG/0.8ML pen kit     calcipotriene (DOVONOX) 0.005 % external cream     No current facility-administered medications for this visit.      Past Medical/Surgical History:   There is no problem list on file for this patient.    No past medical history on file.

## 2022-05-16 NOTE — TELEPHONE ENCOUNTER
PA Initiation    Medication: Skyrizi  Insurance Company: [place] Medicaid ([facility]) - Phone [phone] Fax [phone]  Pharmacy Filling the Rx: [facility] - [place], [place] - [facility] [address]  Filling Pharmacy Phone:    Filling Pharmacy Fax:    Start Date: 5/16/2022

## 2022-05-16 NOTE — NURSING NOTE
Teledermatology Nurse Call Patients:   Chief Complaint   Patient presents with     Psoriasis     Are you in the [facility] at the time of the encounter? yes    Today's visit will be billed to you and your insurance.    A teledermatology visit is not as thorough as an in-person visit and the quality of the photograph sent may not be of the same quality as that taken by the dermatology clinic.  Joyce Rivera, CRISTI on 5/16/2022 at 12:49 PM

## 2022-05-16 NOTE — PATIENT INSTRUCTIONS
[facility] Dermatology Visit    Thank you for allowing us to participate in your care. Your findings, instructions and follow-up plan are as follows:     Psoriasis.   Continue topical therapies and apremilast for now.   Plan to switch to skyrizi pending insurance approval.     When should I call my doctor?  If you are worsening or not improving, please, contact us or seek urgent care as noted below.     Who should I call with questions (adults)?  [facility] (adult and pediatric): [phone]  [facility] (adult): [phone]  For urgent needs outside of business hours call the [facility] at [phone] and ask for the dermatology resident on call  If this is a medical emergency and you are unable to reach an ER, Call [phone]    Who should I call with questions (pediatric)?  [facility]- Pediatric Dermatology  Dr. Ashley Blanco, Dr. Anatoly Freire, Dr. Renuka Beckett, Jesi Gandara, PA  Dr. Gretchen Moreira, Dr. Candie Small & Dr. Yahir Delcid  Non Urgent  Nurse Triage Line; [phone]- Alpa and Sully RN Care Coordinators   Jenise (/Complex ) [phone]    If you need a prescription refill, please contact your pharmacy. Refills are approved or denied by our physicians during normal business hours, Monday through Fridays  Per office policy, refills will not be granted if you have not been seen within the past year (or sooner depending on your child's condition).    Scheduling Information:  Pediatric Appointment Scheduling and Call Center [phone]  Radiology Scheduling- [phone]  Sedation Unit Scheduling- [phone]  [place] Scheduling- General [phone]; Pediatric Dermatology [phone]  Main  Services: [phone]  Serbian: [phone]  Citizen of [place]: [phone]  Hmong/Grupo/Kyrgyz: [phone]  Preadmission Nursing Department Fax Number: [phone] (fax all pre-operative paperwork to this number)    For urgent matters arising during evenings, weekends, or holidays that cannot wait for normal business hours please call [phone] and ask for the dermatology resident on call to be paged.

## 2022-05-16 NOTE — LETTER
5/16/2022         RE: Jessica Tapia  [address] [facility] [address]        Dear Colleague,    Thank you for referring your patient, Jessica Tapia, to the [facility]. Please see a copy of my visit note below.    [facility] Dermatology Note  Encounter Date: May 16, 2022  Store-and-Forward and Telephone ([phone]). Location of teledermatologist: [facility].  Start time: 12:59 PM. End time: 12:12 PM.    Dermatology Problem List:  1. Psoriasis vulgaris +/- PsA  - plan to start Skyrizi  - Apremilast; clobetasol 0.05% solution (scalp), triam 01.% cream or ointment (trunk), protopic 0.1% ointment (face)  - prior tx: clobetasol 0.05% ointment, calcipotriene, protopic, Humira  ____________________________________________    Assessment & Plan:     # Psoriasis vulgaris. Chronic/flare/not at goal.   - Labs ordered: quant gold (will obtained at [facility])  - Plan to start Skyrizi pending insurance approval  - Continue apremilast 30 mg PO BID for now; plan to discontinue once alternative biologic agent approved by insurance  - Scalp: clobetasol 0.05% solution, T-gel shampoo  - Trunk/extremities: triam 0.1% cream in AM, triam 0.1% ointment in PM  - Face: Protopic 0.1% ointment    Procedures Performed:    None    Follow-up: 4 month(s) virtually (telephone with photos), or earlier for new or changing lesions    Staff:     Gavin Garland MD  Pronouns: he/him/his    Department of Dermatology  [facility]: Phone: [phone], Fax:[phone]  [facility]: Phone: [phone] Fax: [phone]  ____________________________________________    CC: Psoriasis    HPI:  Ms. Jessica Tapia is a(n) 51 year old female who presents today as a return patient for psoriasis vulgaris. Last seen on 2/21/22 when continued on  apremilast. Plan to consider Skyrizi if not improved at follow-up.     Today, patient reports she continues to have persistent psoriasis plaques on the extremities/scalp. Has been using topical therapies and apremilast. Tolerating apremilast well without side effects, although she is unclear if she believes it is helpful.     Patient is otherwise feeling well, without additional skin concerns.    Labs Reviewed:  N/A    Physical Exam:  Vitals: There were no vitals taken for this visit.  SKIN: Teledermatology photos were reviewed; image quality and interpretability: acceptable. Image date: 5/16/22.  - Pink psoriasiform appearing plaques on forearms, dorsal hands, lower extremities.   - No other lesions of concern on areas examined.                         Medications:  Current Outpatient Medications   Medication     Apremilast (OTEZLA) 10 & 20 & 30 MG TBPK     apremilast (OTEZLA) 30 MG tablet     blood glucose monitoring (ONE TOUCH ULTRA 2) meter device kit     clobetasol (TEMOVATE) 0.05 % external solution     Lancets (ONETOUCH DELICA PLUS [id number]) MISC     levothyroxine (SYNTHROID/LEVOTHROID) 200 MCG tablet     levothyroxine (SYNTHROID/LEVOTHROID) 25 MCG tablet     PROTOPIC 0.1 % external ointment     triamcinolone (ARISTOCORT HP) 0.5 % external cream     triamcinolone (KENALOG) 0.1 % external cream     triamcinolone (KENALOG) 0.1 % external ointment     adalimumab (HUMIRA *CF*) 40 MG/0.4ML pen kit     adalimumab (HUMIRA *CF*) 40 MG/0.4ML pen kit     adalimumab (HUMIRA *CF*) 80 MG/0.8ML pen kit     calcipotriene (DOVONOX) 0.005 % external cream     No current facility-administered medications for this visit.      Past Medical/Surgical History:   There is no problem list on file for this patient.    No past medical history on file.        Again, thank you for allowing me to participate in the care of your patient.        Sincerely,        Gavin Garland MD

## 2022-05-19 NOTE — TELEPHONE ENCOUNTER
Tried to call [place] Medicaid to check status of PA 3 times and every time I was transferred to rep there was not one on the line. Will try again tomorrow

## 2022-06-07 NOTE — TELEPHONE ENCOUNTER
Spoke with Ins.  There is a specific form for them depending on disease states.  She stated she will fax over the psoriasis form

## 2022-06-15 NOTE — TELEPHONE ENCOUNTER
Spoke with [place] Medicaid they are in need of chart notes and PA # is [id number] confirmed number twice. Faxed over chart notes

## 2022-06-22 NOTE — TELEPHONE ENCOUNTER
Prior Authorization Approval    Authorization Effective Date: 5/16/2022  Authorization Expiration Date: 11/16/2022  Medication: Skyrizi  Approved Dose/Quantity: 1 pen for 28 days  Reference #:   [id number]  Insurance Company: [place] Medicaid "[employer] [employer]) - Phone [phone] Fax [phone]  Expected CoPay: $3.00     CoPay Card Available: No    Foundation Assistance Needed:    Which Pharmacy is filling the prescription (Not needed for infusion/clinic administered): [facility] - [place], [place] - [address]  Pharmacy Notified: Yes  Patient Notified: Yes

## 2022-09-19 ENCOUNTER — MYC MEDICAL ADVICE (OUTPATIENT)
Dept: DERMATOLOGY | Facility: CLINIC | Age: 52
End: 2022-09-19

## 2022-09-23 ENCOUNTER — TELEPHONE (OUTPATIENT)
Dept: DERMATOLOGY | Facility: CLINIC | Age: 52
End: 2022-09-23

## 2022-09-23 NOTE — TELEPHONE ENCOUNTER
Called pt to verify [employer] messages received.  Pt needs to send in photos before a phone visit Monday.    Pt will upload photos later today.    Claribel Muñoz RN

## 2022-09-26 ENCOUNTER — VIRTUAL VISIT (OUTPATIENT)
Dept: DERMATOLOGY | Facility: CLINIC | Age: 52
End: 2022-09-26
Payer: COMMERCIAL

## 2022-09-26 DIAGNOSIS — L40.0 PSORIASIS VULGARIS: ICD-10-CM

## 2022-09-26 PROCEDURE — 99441 PR PHYSICIAN TELEPHONE EVALUATION 5-10 MIN: CPT | Mod: TEL | Performed by: DERMATOLOGY

## 2022-09-26 NOTE — PATIENT INSTRUCTIONS
[facility] Dermatology Visit    Thank you for allowing us to participate in your care. Your findings, instructions and follow-up plan are as follows:    Psoriasis.   Continue Skyrizi.   Follow-up in 8 months (May 2023) for annual TB test and medication refill.       When should I call my doctor?  If you are worsening or not improving, please, contact us or seek urgent care as noted below.     Who should I call with questions (adults)?  [facility] (adult and pediatric): [phone]  [facility] (adult): [phone]  For urgent needs outside of business hours call the [facility] at [phone] and ask for the dermatology resident on call  If this is a medical emergency and you are unable to reach an ER, Call [phone]    Who should I call with questions (pediatric)?  [facility]- Pediatric Dermatology  Dr. Ashley Blanco, Dr. Anatoly Freire, Dr. Renuka Beckett, Jesi Gandara, PA  Dr. Gretchen Moreira, Dr. Candie Small & Dr. Yahir Delcid  Non Urgent  Nurse Triage Line; [phone]- Alpa and Sully RN Care Coordinators   Jenise (/Complex ) [phone]    If you need a prescription refill, please contact your pharmacy. Refills are approved or denied by our physicians during normal business hours, Monday through Fridays  Per office policy, refills will not be granted if you have not been seen within the past year (or sooner depending on your child's condition).    Scheduling Information:  Pediatric Appointment Scheduling and Call Center [phone]  Radiology Scheduling- [phone]  Sedation Unit Scheduling- [phone]  [place] Scheduling- General [phone]; Pediatric Dermatology [phone]  Main  Services: [phone]  Slovak: [phone]  Sammarinese: [phone]  Hmong/Grupo/Japanese: [phone]  Preadmission Nursing Department Fax Number: [phone]  (fax all pre-operative paperwork to this number)    For urgent matters arising during evenings, weekends, or holidays that cannot wait for normal business hours please call [phone] and ask for the dermatology resident on call to be paged.

## 2022-09-26 NOTE — LETTER
9/26/2022         RE: Jessica Tapia  [address] [place] [address]        Dear Colleague,    Thank you for referring your patient, Jessica Tapia, to the [facility]. Please see a copy of my visit note below.    [facility] Dermatology Note  Encounter Date: Sep 26, 2022  Store-and-Forward and Telephone ([phone]). Location of teledermatologist: [facility].  Start time: 3:23 PM. End time: 3:30 PM.    Dermatology Problem List:  1. Psoriasis vulgaris +/- PsA  - plan to start Skyrizi  - Apremilast; clobetasol 0.05% solution (scalp), triam 01.% cream or ointment (trunk), protopic 0.1% ointment (face)  - prior tx: clobetasol 0.05% ointment, calcipotriene, protopic, Humira  ____________________________________________     Assessment & Plan:      # Psoriasis vulgaris. Chronic/stable/improved.   - Continue skyrizi q3 months  - Can use topicals PRN flares.  - Follow-up in May 2023 for annual TB test and med refill.     Procedures Performed:    None    Follow-up: 8 month(s) virtually (telephone with photos), or earlier for new or changing lesions    Staff and Scribe:     Scribe Disclosure:   I, Fadi Dugan, am serving as a scribe to document services personally performed by this physician based on data collection and the provider's statements to me.     Provider Disclosure:   The documentation recorded by the scribe accurately reflects the services I personally performed and the decisions made by me.    Gavin Garland MD    Department of Dermatology  [facility]: Phone: [phone], Fax:[phone]  [facility]: Phone: [phone] Fax: [phone]  ____________________________________________    CC: Psoriasis (Psoriasis vulgaris)    HPI:  Ms. Jessica Tapia is a(n) 52 year old female who presents today as a  return patient for psoriasis follow up. She states things are improved significantly on Skyrizi. Has stopped otezla and topical therapies. She gets occasional flare-ups on the scalp and abdomen but respond quickly to over-the-counter moisturizer. Tolerating Skyrizi well without side effects.     Patient is otherwise feeling well, without additional skin concerns.    Labs Reviewed:  N/A    Physical Exam:  Vitals: There were no vitals taken for this visit.  SKIN: Teledermatology photos were reviewed; image quality and interpretability: acceptable. Image date: 9/26/22.  - Exposed skin on face, trunk, and extremities relatively clear on limited photoexamination.   - No other lesions of concern on areas examined.                       Medications:  Current Outpatient Medications   Medication     blood glucose monitoring (ONE TOUCH ULTRA 2) meter device kit     calcipotriene (DOVONOX) 0.005 % external cream     clobetasol (TEMOVATE) 0.05 % external solution     Lancets (ONETOUCH DELICA PLUS [id number]) MISC     levothyroxine (SYNTHROID/LEVOTHROID) 200 MCG tablet     levothyroxine (SYNTHROID/LEVOTHROID) 25 MCG tablet     PROTOPIC 0.1 % external ointment     Risankizumab-rzaa 150 MG/ML SOAJ     Risankizumab-rzaa 150 MG/ML SOAJ     triamcinolone (ARISTOCORT HP) 0.5 % external cream     triamcinolone (KENALOG) 0.1 % external cream     triamcinolone (KENALOG) 0.1 % external ointment     adalimumab (HUMIRA *CF*) 40 MG/0.4ML pen kit     adalimumab (HUMIRA *CF*) 40 MG/0.4ML pen kit     adalimumab (HUMIRA *CF*) 80 MG/0.8ML pen kit     Apremilast (OTEZLA) 10 & 20 & 30 MG TBPK     apremilast (OTEZLA) 30 MG tablet     No current facility-administered medications for this visit.      Past Medical/Surgical History:   There is no problem list on file for this patient.    No past medical history on file.        Again, thank you for allowing me to participate in the care of your patient.        Sincerely,        Gavin Garland MD

## 2022-09-26 NOTE — PROGRESS NOTES
[facility] Dermatology Note  Encounter Date: Sep 26, 2022  Store-and-Forward and Telephone ([phone]). Location of teledermatologist: [facility].  Start time: 3:23 PM. End time: 3:30 PM.    Dermatology Problem List:  1. Psoriasis vulgaris +/- PsA  - plan to start Skyrizi  - Apremilast; clobetasol 0.05% solution (scalp), triam 01.% cream or ointment (trunk), protopic 0.1% ointment (face)  - prior tx: clobetasol 0.05% ointment, calcipotriene, protopic, Humira  ____________________________________________     Assessment & Plan:      # Psoriasis vulgaris. Chronic/stable/improved.   - Continue skyrizi q3 months  - Can use topicals PRN flares.  - Follow-up in May 2023 for annual TB test and med refill.     Procedures Performed:    None    Follow-up: 8 month(s) virtually (telephone with photos), or earlier for new or changing lesions    Staff and Scribe:     Scribe Disclosure:   I, Fadi Dugan, am serving as a scribe to document services personally performed by this physician based on data collection and the provider's statements to me.     Provider Disclosure:   The documentation recorded by the scribe accurately reflects the services I personally performed and the decisions made by me.    Gavin Garland MD    Department of Dermatology  [facility]: Phone: [phone], Fax:[phone]  [facility]: Phone: [phone] Fax: [phone]  ____________________________________________    CC: Psoriasis (Psoriasis vulgaris)    HPI:  Ms. Jessica aTpia is a(n) 52 year old female who presents today as a return patient for psoriasis follow up. She states things are improved significantly on Skyrizi. Has stopped otezla and topical therapies. She gets occasional flare-ups on the scalp and abdomen but respond quickly to over-the-counter moisturizer.  Tolerating Skyrizi well without side effects.     Patient is otherwise feeling well, without additional skin concerns.    Labs Reviewed:  N/A    Physical Exam:  Vitals: There were no vitals taken for this visit.  SKIN: Teledermatology photos were reviewed; image quality and interpretability: acceptable. Image date: 9/26/22.  - Exposed skin on face, trunk, and extremities relatively clear on limited photoexamination.   - No other lesions of concern on areas examined.                       Medications:  Current Outpatient Medications   Medication     blood glucose monitoring (ONE TOUCH ULTRA 2) meter device kit     calcipotriene (DOVONOX) 0.005 % external cream     clobetasol (TEMOVATE) 0.05 % external solution     Lancets (ONETOUCH DELICA PLUS [id number]) MISC     levothyroxine (SYNTHROID/LEVOTHROID) 200 MCG tablet     levothyroxine (SYNTHROID/LEVOTHROID) 25 MCG tablet     PROTOPIC 0.1 % external ointment     Risankizumab-rzaa 150 MG/ML SOAJ     Risankizumab-rzaa 150 MG/ML SOAJ     triamcinolone (ARISTOCORT HP) 0.5 % external cream     triamcinolone (KENALOG) 0.1 % external cream     triamcinolone (KENALOG) 0.1 % external ointment     adalimumab (HUMIRA *CF*) 40 MG/0.4ML pen kit     adalimumab (HUMIRA *CF*) 40 MG/0.4ML pen kit     adalimumab (HUMIRA *CF*) 80 MG/0.8ML pen kit     Apremilast (OTEZLA) 10 & 20 & 30 MG TBPK     apremilast (OTEZLA) 30 MG tablet     No current facility-administered medications for this visit.      Past Medical/Surgical History:   There is no problem list on file for this patient.    No past medical history on file.

## 2022-09-26 NOTE — NURSING NOTE
Teledermatology Nurse Call Patients:     Are you in the [facility] at the time of the encounter? yes    Today's visit will be billed to you and your insurance.    A teledermatology visit is not as thorough as an in-person visit and the quality of the photograph sent may not be of the same quality as that taken by the dermatology clinic.  Joyce Rivera, CRISTI on 9/26/2022 at 2:14 PM

## 2022-11-19 ENCOUNTER — HEALTH MAINTENANCE LETTER (OUTPATIENT)
Age: 52
End: 2022-11-19

## 2023-01-05 ENCOUNTER — TELEPHONE (OUTPATIENT)
Dept: DERMATOLOGY | Facility: CLINIC | Age: 53
End: 2023-01-05
Payer: COMMERCIAL

## 2023-01-05 NOTE — TELEPHONE ENCOUNTER
Skyrizi PA renewal needed. Emailed Dr. Gavin Larry nurse to obtain provider's signature to submit without success. [place] Medicaid requires provider's signature on form.

## 2023-01-13 NOTE — TELEPHONE ENCOUNTER
Printed form and will have Dr Garland sign on Monday.    Beryl Small RN on 1/13/2023 at 4:04 PM

## 2023-01-30 NOTE — TELEPHONE ENCOUNTER
Date: 1/30     Company: [employer] ([place] Medicaid)     Phone Number:  [phone]     Comments: Spoke to Rep.  Rep stated that the fax was received but had a question on the form so they mailed the form directly to the  Specialty pharmacy on [address].  When asked what the question was he was unable to tell me and said we would need to speak to the pharmacy.  Mi messaged the PFA to keep an eye out for the letter.

## 2023-02-15 NOTE — TELEPHONE ENCOUNTER
Prior Authorization Approval    Authorization Effective Date: 1/24/2023  Authorization Expiration Date: 1/24/2024  Medication: SKYRIZI 150MB/ML PEN  Approved Dose/Quantity: 1 FOR 84 DAYS  Reference #:     Insurance Company: [place] Medicaid ([facility] [employer]) - Phone [phone] Fax [phone]  Expected CoPay:       CoPay Card Available:      Foundation Assistance Needed:    Which Pharmacy is filling the prescription (Not needed for infusion/clinic administered): [facility] [address]  Pharmacy Notified: Yes  Patient Notified: Yes

## 2023-05-15 ENCOUNTER — VIRTUAL VISIT (OUTPATIENT)
Dept: DERMATOLOGY | Facility: CLINIC | Age: 53
End: 2023-05-15
Payer: COMMERCIAL

## 2023-05-15 DIAGNOSIS — L40.0 PSORIASIS VULGARIS: Primary | ICD-10-CM

## 2023-05-15 DIAGNOSIS — L40.50 PSORIATIC ARTHRITIS (H): ICD-10-CM

## 2023-05-15 PROCEDURE — 99442 PR PHYSICIAN TELEPHONE EVALUATION 11-20 MIN: CPT | Mod: 93 | Performed by: DERMATOLOGY

## 2023-05-15 RX ORDER — ROFLUMILAST 3 MG/G
1 CREAM TOPICAL DAILY
Qty: 60 G | Refills: 11 | Status: SHIPPED | OUTPATIENT
Start: 2023-05-15

## 2023-05-15 NOTE — NURSING NOTE
Teledermatology Nurse Call Patients:     Are you in the [facility] at the time of the encounter? yes    Today's visit will be billed to you and your insurance.    A teledermatology visit is not as thorough as an in-person visit and the quality of the photograph sent may not be of the same quality as that taken by the dermatology clinic.    Chief Complaint   Patient presents with     Telephone     Follow up     Pt is taking 212mg of levothyroxine- the 25 mg is cut in half.    Mi Andrew on 5/15/2023 at 2:04 PM

## 2023-05-15 NOTE — LETTER
5/15/2023         RE: Jessica Tapia  [address] [facility] [address]        Dear Colleague,    Thank you for referring your patient, Jessica Tapia, to the [facility]. Please see a copy of my visit note below.    [facility] Dermatology Note  Encounter Date: May 15, 2023  Store-and-Forward and Telephone ([phone] ). Location of teledermatologist: [facility].  Start time: 2:28 PM. End time: 2:43 PM.    Dermatology Problem List:  1. Psoriasis vulgaris +/- PsA  - plan to start Skyrizi  - Apremilast; clobetasol 0.05% solution (scalp), triam 01.% cream or ointment (trunk), protopic 0.1% ointment (face)  - prior tx: clobetasol 0.05% ointment, calcipotriene, protopic, Humira    ____________________________________________    Assessment & Plan:     # Psoriasis vulgaris +/- PsA. Chronic/flare/not at goal.   - Labs: TB quant gold (will fax orders to [facility] in [place], [place])  - Continue skyrizi q3 months  - Can use topicals PRN flares. Will start zorvye 0.3% cream qdaily PRN flares.   - Consider switch to IL-17 in future if joint symptoms or skin does not improve; patient prefers to continue with Skyrizi for now with recent flare attributable to temporary discontinuation of Skyrizi.     Procedures Performed:    None    Follow-up: 1 year(s) in-person, or earlier for new or changing lesions    Staff:     Gavin Garland MD  Pronouns: he/him/his    Department of Dermatology  [facility]: Phone: [phone], Fax:[phone]  [facility]: Phone: [phone] Fax: [phone]  ____________________________________________    CC: Telephone (Follow up)    HPI:  Ms. Jessica Tapia is a(n) 52 year old female who presents today as a return patient for psoriasis vulgaris. Last seen on 9/26/22 when continued on skyrizi.      Today, reports mild flare of psoriasis with few areas on the trunk, posterior neck/scalp and fingers. Had been off Skyrizi x 1.5 month due to insurance issues, but restarted about 1-month ago. Next dose due in July. Reports mild joint pains in fingers/knees, which flared after discontinuing Skyrizi. Has seen rheum in the past who were suspicious for PsA but never definitely diagnosed.     Patient is otherwise feeling well, without additional skin concerns.    Labs Reviewed:  N/A    Physical Exam:  Vitals: There were no vitals taken for this visit.  SKIN: Teledermatology photos were reviewed; image quality and interpretability: acceptable. Image date: 5/15/23.  - No significant psoriasiform appearing lesions on exposed areas of trunk or extremities  - No other lesions of concern on areas examined.                       Medications:  Current Outpatient Medications   Medication     calcipotriene (DOVONOX) 0.005 % external cream     clobetasol (TEMOVATE) 0.05 % external solution     Lancets (ONETOUCH DELICA PLUS [id number]) MISC     levothyroxine (SYNTHROID/LEVOTHROID) 200 MCG tablet     levothyroxine (SYNTHROID/LEVOTHROID) 25 MCG tablet     Risankizumab-rzaa (SKYRIZI) 150 MG/ML subcutaneous     triamcinolone (ARISTOCORT HP) 0.5 % external cream     triamcinolone (KENALOG) 0.1 % external cream     blood glucose monitoring (ONE TOUCH ULTRA 2) meter device kit     PROTOPIC 0.1 % external ointment     triamcinolone (KENALOG) 0.1 % external ointment     No current facility-administered medications for this visit.      Past Medical/Surgical History:   There is no problem list on file for this patient.    No past medical history on file.      Again, thank you for allowing me to participate in the care of your patient.        Sincerely,        Gavin Garland MD

## 2023-05-15 NOTE — PATIENT INSTRUCTIONS
[facility] Dermatology Visit    Thank you for allowing us to participate in your care. Your findings, instructions and follow-up plan are as follows:     [email or web address]      When should I call my doctor?  If you are worsening or not improving, please, contact us or seek urgent care as noted below.     Who should I call with questions (adults)?  [facility] (adult and pediatric): [phone]  [facility] (adult): [phone]  For urgent needs outside of business hours call the [facility] at [phone] and ask for the dermatology resident on call  If this is a medical emergency and you are unable to reach an ER, Call 911    Who should I call with questions (pediatric)?  [facility]- Pediatric Dermatology  Dr. Ashley Blanco, Dr. Anatoly Freire, Dr. Renuka Beckett, Jesi Gandara, PA  Dr. Gretchen Moreira, Dr. Candie Small & Dr. Yahir Delcid  Non Urgent  Nurse Triage Line; [phone]- Alpa and Sully RN Care Coordinators   Jenise (/Complex ) [phone]    If you need a prescription refill, please contact your pharmacy. Refills are approved or denied by our physicians during normal business hours, Monday through Fridays  Per office policy, refills will not be granted if you have not been seen within the past year (or sooner depending on your child's condition).    Scheduling Information:  Pediatric Appointment Scheduling and Call Center [phone]  Radiology Scheduling- [phone]  Sedation Unit Scheduling- [phone]  [place] Scheduling- General [phone]; Pediatric Dermatology [phone]  Main  Services: [phone]  Guatemalan: [phone]  Mozambican: [phone]  Hmong/Argentine/Joshua: [phone]  Preadmission Nursing Department Fax Number: [phone] (fax all pre-operative  paperwork to this number)    For urgent matters arising during evenings, weekends, or holidays that cannot wait for normal business hours please call [phone] and ask for the dermatology resident on call to be paged.

## 2023-05-15 NOTE — PROGRESS NOTES
[facility] Dermatology Note  Encounter Date: May 15, 2023  Store-and-Forward and Telephone ([phone] ). Location of teledermatologist: [facility].  Start time: 2:28 PM. End time: 2:43 PM.    Dermatology Problem List:  1. Psoriasis vulgaris +/- PsA  - plan to start Skyrizi  - Apremilast; clobetasol 0.05% solution (scalp), triam 01.% cream or ointment (trunk), protopic 0.1% ointment (face)  - prior tx: clobetasol 0.05% ointment, calcipotriene, protopic, Humira    ____________________________________________    Assessment & Plan:     # Psoriasis vulgaris +/- PsA. Chronic/flare/not at goal.   - Labs: TB quant gold (will fax orders to [facility] in [place], [place])  - Continue skyrizi q3 months  - Can use topicals PRN flares. Will start zorvye 0.3% cream qdaily PRN flares.   - Consider switch to IL-17 in future if joint symptoms or skin does not improve; patient prefers to continue with Skyrizi for now with recent flare attributable to temporary discontinuation of Skyrizi.     Procedures Performed:    None    Follow-up: 1 year(s) in-person, or earlier for new or changing lesions    Staff:     Gavin Garland MD  Pronouns: he/him/his    Department of Dermatology  [facility]: Phone: [phone], Fax:[phone]  [facility]: Phone: [phone] Fax: [phone]  ____________________________________________    CC: Telephone (Follow up)    HPI:  Ms. Jessica Tapia is a(n) 52 year old female who presents today as a return patient for psoriasis vulgaris. Last seen on 9/26/22 when continued on skyrizi.     Today, reports mild flare of psoriasis with few areas on the trunk, posterior neck/scalp and fingers. Had been off Skyrizi x 1.5 month due to insurance issues, but restarted about 1-month ago. Next dose due in July. Reports mild joint pains in  fingers/knees, which flared after discontinuing Skyrizi. Has seen rheum in the past who were suspicious for PsA but never definitely diagnosed.     Patient is otherwise feeling well, without additional skin concerns.    Labs Reviewed:  N/A    Physical Exam:  Vitals: There were no vitals taken for this visit.  SKIN: Teledermatology photos were reviewed; image quality and interpretability: acceptable. Image date: 5/15/23.  - No significant psoriasiform appearing lesions on exposed areas of trunk or extremities  - No other lesions of concern on areas examined.                       Medications:  Current Outpatient Medications   Medication     calcipotriene (DOVONOX) 0.005 % external cream     clobetasol (TEMOVATE) 0.05 % external solution     Lancets (ONETOUCH DELICA PLUS [id number]) MISC     levothyroxine (SYNTHROID/LEVOTHROID) 200 MCG tablet     levothyroxine (SYNTHROID/LEVOTHROID) 25 MCG tablet     Risankizumab-rzaa (SKYRIZI) 150 MG/ML subcutaneous     triamcinolone (ARISTOCORT HP) 0.5 % external cream     triamcinolone (KENALOG) 0.1 % external cream     blood glucose monitoring (ONE TOUCH ULTRA 2) meter device kit     PROTOPIC 0.1 % external ointment     triamcinolone (KENALOG) 0.1 % external ointment     No current facility-administered medications for this visit.      Past Medical/Surgical History:   There is no problem list on file for this patient.    No past medical history on file.

## 2023-05-16 ENCOUNTER — TELEPHONE (OUTPATIENT)
Dept: DERMATOLOGY | Facility: CLINIC | Age: 53
End: 2023-05-16
Payer: COMMERCIAL

## 2023-05-16 NOTE — TELEPHONE ENCOUNTER
Please start prior authorization on medication Zoryve 0.3% Cream.      Jennie Asencio on 5/16/2023 at 10:49 AM

## 2023-05-21 NOTE — TELEPHONE ENCOUNTER
PA Initiation    Medication: ZORYVE 0.3 % EX CREA  Insurance Company: State ([facility]) - Phone [phone] Fax [phone]  Pharmacy Filling the Rx: [facility] [address] [place] [address]  Filling Pharmacy Phone: [phone]  Filling Pharmacy Fax: [phone]  Start Date: 5/20/2023

## 2023-05-23 NOTE — TELEPHONE ENCOUNTER
Contacted pharmacy to follow up on PA status.  Per pharmacy they did receive form but have not called it in yet due to being busy.  They will try to call it in today.

## 2023-05-25 NOTE — TELEPHONE ENCOUNTER
Prior Authorization Approval    Medication: ZORYVE 0.3 % EX CREA  Authorization Effective Date: 5/24/2023  Authorization Expiration Date: 5/24/2024  Approved Dose/Quantity:   Reference #:     Insurance Company: [employer] ([facility]) - Phone [phone] Fax [phone]  Expected CoPay:       CoPay Card Available:      Financial Assistance Needed:   Which Pharmacy is filling the prescription: [facility] [address] [place] [address]  Pharmacy Notified: Yes  Patient Notified: Yes **Instructed pharmacy to notify patient when script is ready to /ship.**    Contacted pharmacy to follow up on PA request.  Per pharmacy they did receive approval.  Received approval info verbally from pharmacy.

## 2023-08-23 DIAGNOSIS — L40.0 PSORIASIS VULGARIS: ICD-10-CM

## 2023-08-28 RX ORDER — TACROLIMUS 0.1 %
OINTMENT (GRAM) TOPICAL
Qty: 60 G | Refills: 11 | Status: SHIPPED | OUTPATIENT
Start: 2023-08-28 | End: 2023-09-07

## 2023-08-28 NOTE — TELEPHONE ENCOUNTER
Protopic 0.1 % External Ointment       Last Written Prescription Date:  2-21-22  Last Fill Quantity: 60 g,   # refills: 11  Last Office Visit : 5-15-23 ( RTC 1 Y)  Future Office visit:  none  Derm process 2      Routing refill request to provider for review/approval because:  Gap in RF

## 2023-09-07 DIAGNOSIS — L40.0 PSORIASIS VULGARIS: ICD-10-CM

## 2023-09-07 NOTE — TELEPHONE ENCOUNTER
[facility] in [place], [place] would like to substitute the generic Protopic Tacrolimus ointment.     Eduardo BOUCHER

## 2023-09-08 RX ORDER — TACROLIMUS 0.1 %
OINTMENT (GRAM) TOPICAL
Qty: 60 G | Refills: 11 | Status: SHIPPED | OUTPATIENT
Start: 2023-09-08

## 2023-10-27 ENCOUNTER — TELEPHONE (OUTPATIENT)
Dept: DERMATOLOGY | Facility: CLINIC | Age: 53
End: 2023-10-27

## 2023-10-27 NOTE — TELEPHONE ENCOUNTER
[facility] Call Center    Phone Message    May a detailed message be left on voicemail: yes     Reason for Call: Other: Pt states she used to see Dr. Garland but needs to be scheduled with Dr. Izaguirre instead and would like to know if she needs to be seen in person.     Pt is also sending an application for Pt assistance for the skyrizi in her MyChart and needs Dr. Izaguirre to complete portions of it. She would like to know if she needs to be seen first since she was already on it with .     Please call pt back to discuss. Thank you.     Action Taken: Message routed to:  Adult Clinics: Dermatology p [id number]    Travel Screening: Not Applicable

## 2023-10-31 NOTE — TELEPHONE ENCOUNTER
Pt called and scheduled for follow up with Dr Izaguirre in May. Pt needs help with pt assistance forms for skyrizi. Will route message to pharmacy liaison to assist.    Beryl Small RN on 10/31/2023 at 10:49 AM

## 2023-11-21 NOTE — TELEPHONE ENCOUNTER
Called patient and discussed free drug application. She no longer has [place] Medicaid and is due for her next injection January 1.    Sending request to Dr. Izaguirre's team for completion of provider application.    Ana Garcia [facility] Liaison  [email or web address]  Phone: [phone]  Fax: [phone]

## 2023-11-27 NOTE — TELEPHONE ENCOUNTER
Faxed provider application to free drug program.    Ana Garcia [facility] Liaison  [email or web address]  Phone: [phone]  Fax: [phone]

## 2023-12-04 NOTE — TELEPHONE ENCOUNTER
Spoke with [place] Medicaid. PA #[id number] is pending but they are still waiting for form [id number], refaxed form to forward health   Medical Orders for Life-Sustaining Treatment (MOLST)

## 2023-12-06 NOTE — TELEPHONE ENCOUNTER
Program sent a fax. Patient is approved until 12/4/24. Called patient and left a voicemail to let her know.        Ana Garcia [facility]  [facility] Liaison  [email or web address]  Phone: [phone]  Fax: [phone]

## 2023-12-08 NOTE — TELEPHONE ENCOUNTER
"Called [facility] to clarify Rx. They claimed that Dr. Izaguirre's signature looked like two people signed it, one of them titled \"FAAD.\"    Explained to pharmacist that this is a dermatologist title, fellow of the [employer]. She conceded and allowed the signed prescription.    Called patient and informed her that her prescription was clarified and she can fill her medication with [facility] pharmacy now.    FREE DRUG APPLICATION APPROVED    Medication: SKYRIZI  MG/ML SC SOAJ  Program Name:  [facility]  Effective Date: 12/5/2023  Expiration Date: 12/5/2024  Pharmacy Filling the Rx:    Patient Notified: yes via phone  Additional Information: CARLOS Garcia CphT  [facility] Liaison  [email or web address]  Phone: [phone]  Fax: [phone]    "

## 2024-01-27 ENCOUNTER — HEALTH MAINTENANCE LETTER (OUTPATIENT)
Age: 54
End: 2024-01-27

## 2024-03-27 ENCOUNTER — TELEPHONE (OUTPATIENT)
Dept: DERMATOLOGY | Facility: CLINIC | Age: 54
End: 2024-03-27
Payer: COMMERCIAL

## 2024-03-27 DIAGNOSIS — L40.0 PSORIASIS VULGARIS: ICD-10-CM

## 2024-03-27 NOTE — TELEPHONE ENCOUNTER
[facility] Call Center    Phone Message    May a detailed message be left on voicemail: yes     Reason for Call: Medication Refill Request    Has the patient contacted the pharmacy for the refill? Yes   Name of medication being requested: [facility] needs an Rx for Risankizumab-rzaa (SKYRIZI) 150 MG/ML subcutaneous - please call [phone] opt 1  Provider who prescribed the medication: Zina  Pharmacy: [facility]  Phone # [phone] opt 1  Date medication is needed: Now     [facility] needs an Rx for Risankizumab-rzaa (SKYRIZI) 150 MG/ML subcutaneous - please call [phone] opt 1    Action Taken: Message routed to:  Adult Clinics: Dermatology p [id number]    Travel Screening: Not Applicable

## 2024-03-27 NOTE — TELEPHONE ENCOUNTER
Patient has upcoming visit with Dr. Izaguirre 5/20. Routing to Dr. Izaguirre to review and sign pended order.     Emily Perez RN

## 2024-03-29 NOTE — TELEPHONE ENCOUNTER
Called pt and informed her that we are waiting on Dr Izaguirre to approve the script since it was last ordered by Dr Garland.    Beryl Small RN on 3/29/2024 at 3:10 PM

## 2024-05-20 ENCOUNTER — VIRTUAL VISIT (OUTPATIENT)
Dept: DERMATOLOGY | Facility: CLINIC | Age: 54
End: 2024-05-20

## 2024-05-20 DIAGNOSIS — L40.0 PSORIASIS VULGARIS: ICD-10-CM

## 2024-05-20 DIAGNOSIS — Z51.81 MEDICATION MONITORING ENCOUNTER: Primary | ICD-10-CM

## 2024-05-20 PROCEDURE — 99214 OFFICE O/P EST MOD 30 MIN: CPT | Mod: GC | Performed by: DERMATOLOGY

## 2024-05-20 NOTE — NURSING NOTE
Lab order was faxed to [facility] [place] at [phone].    Marjan Lin on 5/20/2024 at 12:25 PM

## 2024-05-20 NOTE — NURSING NOTE
Teledermatology Nurse Call Patients:     Are you in the [facility] at the time of the encounter? yes    Today's visit will be billed to you and your insurance.    A teledermatology visit is not as thorough as an in-person visit and the quality of the photograph sent may not be of the same quality as that taken by the dermatology clinic.

## 2024-05-20 NOTE — PROGRESS NOTES
[facility] Dermatology Note  Encounter Date: May 20, 2024  Telephone. Location of teledermatologist: [facility]. Start time: 9:45 AM. End time: 9:55 AM.    Dermatology Problem List:  1. Psoriasis vulgaris +/- PsA (had seen rheum in the past for arthritis but never definitively diagnosed)  - Skyrizi, topical roflumilast  - Last quant gold: 8/2023  - Past: Apremilast; clobetasol 0.05% solution (scalp), triam 01.% cream or ointment (trunk), protopic 0.1% ointment (face), prior tx: clobetasol 0.05% ointment, calcipotriene, protopic, Humira  ____________________________________________    Assessment & Plan:     # Psoriasis vulgaris +/- PsA. Overall significantly improved on Skyrizi with few resistant areas.   - Continue Skyrizi q3 months  - Due for quant gold August 2024: typically has this checked in [place], [place] at the [facility]. Plan to fax this.   - Increase use of roflumilast 0.3% cream to daily to resistant areas    Procedures Performed:    None    Follow-up: 12 months     Staff and Resident:     Padmaja Abdul MD (PGY-3)  Dermatology Resident     Staff Physician Comments:   I evaluated any available patient photographs with the resident and I edited the assessment and plan as documented in the note. I was present on the line and participated in the entire telephone call.    Yahir Izaguirre MD   of Dermatology  Department of Dermatology  [facility]        ____________________________________________    CC: Psoriasis (Patient reports improvement. Patient reports only spots on stomach and hands. )    HPI:  Ms. Jessica Tapia is a 53 year old female who presents today as a return patient for psoriasis follow up. Overall she says her psoriasis is significantly improved. She has a large area on her abdomen that has been present for a long time. Also having a little bit of flaring on her fingers. Reports the  roflumilast typically works very well but she has not been consistent with application. Her Skyrizi dose was a little delayed (about 2-3 weeks) due to insurance reasons.    Patient is otherwise feeling well, without additional skin concerns.    Labs Reviewed:  N/A    Physical Exam:  Vitals: There were no vitals taken for this visit.  SKIN: Teledermatology photos were reviewed; image quality and interpretability: acceptable. Image date: 5/19/24.  - Pink/red plaque on the abdomen  - Other photos with no significant findings noted  - No other lesions of concern on areas examined.     Medications:  Current Outpatient Medications   Medication Sig Dispense Refill    calcipotriene (DOVONOX) 0.005 % external cream Apply topically as needed      clobetasol (TEMOVATE) 0.05 % external solution Apply 10-15 drops daily as needed for psoriasis on the scalp 50 mL 11    Lancets (ONETOUCH DELICA PLUS [id number]) MISC as needed      levothyroxine (SYNTHROID/LEVOTHROID) 200 MCG tablet daily      levothyroxine (SYNTHROID/LEVOTHROID) 25 MCG tablet Take 1 tablet by mouth daily      PROTOPIC 0.1 % external ointment APPLY TWICE DAILY AS NEEDED FOR PSORIASIS ON FACE. 60 g 11    Risankizumab-rzaa (SKYRIZI) 150 MG/ML subcutaneous Inject 1 mL (150 mg) Subcutaneous every 3 months 1 mL 3    Roflumilast (ZORYVE) 0.3 % CREA Externally apply 1 Dose topically daily Apply once daily to psoriasis on trunk or extremities 60 g 11    triamcinolone (ARISTOCORT HP) 0.5 % external cream as needed      triamcinolone (KENALOG) 0.1 % external cream Apply twice daily as needed for psoriasis on trunk or extremities 454 g 11    blood glucose monitoring (ONE TOUCH ULTRA 2) meter device kit as needed (Patient not taking: Reported on 5/15/2023)      triamcinolone (KENALOG) 0.1 % external ointment Apply twice daily as needed for psoriasis on trunk or extremities (Patient not taking: Reported on 5/15/2023) 454 g 11     No current facility-administered medications for  this visit.      Past Medical/Surgical History:   There is no problem list on file for this patient.    No past medical history on file.    CC No referring provider defined for this encounter. on close of this encounter.

## 2024-05-20 NOTE — LETTER
5/20/2024         RE: Jessica aTpia  [address] [facility] [address]        Dear Colleague,    Thank you for referring your patient, Jessica Tapia, to the [facility]. Please see a copy of my visit note below.    [facility] Dermatology Note  Encounter Date: May 20, 2024  Telephone. Location of teledermatologist: [facility]. Start time: 9:45 AM. End time: 9:55 AM.    Dermatology Problem List:  1. Psoriasis vulgaris +/- PsA (had seen rheum in the past for arthritis but never definitively diagnosed)  - Skyrizi, topical roflumilast  - Last quant gold: 8/2023  - Past: Apremilast; clobetasol 0.05% solution (scalp), triam 01.% cream or ointment (trunk), protopic 0.1% ointment (face), prior tx: clobetasol 0.05% ointment, calcipotriene, protopic, Humira  ____________________________________________    Assessment & Plan:     # Psoriasis vulgaris +/- PsA. Overall significantly improved on Skyrizi with few resistant areas.   - Continue Skyrizi q3 months  - Due for quant gold August 2024: typically has this checked in [place], [place] at the [facility]. Plan to fax this.   - Increase use of roflumilast 0.3% cream to daily to resistant areas    Procedures Performed:    None    Follow-up: 12 months     Staff and Resident:     Padmaja Abdul MD (PGY-3)  Dermatology Resident     Staff Physician Comments:   I evaluated any available patient photographs with the resident and I edited the assessment and plan as documented in the note. I was present on the line and participated in the entire telephone call.    Yahir Izaguirre MD   of Dermatology  Department of Dermatology  [facility]        ____________________________________________    CC: Psoriasis (Patient reports improvement. Patient reports only spots on stomach and hands. )    HPI:  Ms. Jessica Tapia is a 53 year old female who presents  today as a return patient for psoriasis follow up. Overall she says her psoriasis is significantly improved. She has a large area on her abdomen that has been present for a long time. Also having a little bit of flaring on her fingers. Reports the roflumilast typically works very well but she has not been consistent with application. Her Skyrizi dose was a little delayed (about 2-3 weeks) due to insurance reasons.    Patient is otherwise feeling well, without additional skin concerns.    Labs Reviewed:  N/A    Physical Exam:  Vitals: There were no vitals taken for this visit.  SKIN: Teledermatology photos were reviewed; image quality and interpretability: acceptable. Image date: 5/19/24.  - Pink/red plaque on the abdomen  - Other photos with no significant findings noted  - No other lesions of concern on areas examined.     Medications:  Current Outpatient Medications   Medication Sig Dispense Refill     calcipotriene (DOVONOX) 0.005 % external cream Apply topically as needed       clobetasol (TEMOVATE) 0.05 % external solution Apply 10-15 drops daily as needed for psoriasis on the scalp 50 mL 11     Lancets (ONETOUCH DELICA PLUS [id number]) MISC as needed       levothyroxine (SYNTHROID/LEVOTHROID) 200 MCG tablet daily       levothyroxine (SYNTHROID/LEVOTHROID) 25 MCG tablet Take 1 tablet by mouth daily       PROTOPIC 0.1 % external ointment APPLY TWICE DAILY AS NEEDED FOR PSORIASIS ON FACE. 60 g 11     Risankizumab-rzaa (SKYRIZI) 150 MG/ML subcutaneous Inject 1 mL (150 mg) Subcutaneous every 3 months 1 mL 3     Roflumilast (ZORYVE) 0.3 % CREA Externally apply 1 Dose topically daily Apply once daily to psoriasis on trunk or extremities 60 g 11     triamcinolone (ARISTOCORT HP) 0.5 % external cream as needed       triamcinolone (KENALOG) 0.1 % external cream Apply twice daily as needed for psoriasis on trunk or extremities 454 g 11     blood glucose monitoring (ONE TOUCH ULTRA 2) meter device kit as needed (Patient  not taking: Reported on 5/15/2023)       triamcinolone (KENALOG) 0.1 % external ointment Apply twice daily as needed for psoriasis on trunk or extremities (Patient not taking: Reported on 5/15/2023) 454 g 11     No current facility-administered medications for this visit.      Past Medical/Surgical History:   There is no problem list on file for this patient.    No past medical history on file.    CC No referring provider defined for this encounter. on close of this encounter.       Again, thank you for allowing me to participate in the care of your patient.        Sincerely,        Yahir Izaguirre MD

## 2024-05-24 ENCOUNTER — VIRTUAL VISIT (OUTPATIENT)
Dept: DERMATOLOGY | Facility: CLINIC | Age: 54
End: 2024-05-24
Attending: DERMATOLOGY

## 2024-05-24 DIAGNOSIS — E03.9 HYPOTHYROIDISM, UNSPECIFIED TYPE: ICD-10-CM

## 2024-05-24 DIAGNOSIS — L40.0 PSORIASIS VULGARIS: Primary | ICD-10-CM

## 2024-05-24 NOTE — Clinical Note
5/24/2024       RE: Jessica Tapia  [address] [facility] [address]     Dear Colleague,    Thank you for referring your patient, Jessica Tapia, to the [facility]HEUMATOLOGY CLINIC [place] at [facility]. Please see a copy of my visit note below.    Medication Therapy Management (MTM) Encounter    ASSESSMENT:                            Medication Adherence/Access: {adherencechoices:878697}    ***:  ***      PLAN:                            ***    Follow-up: {followuptest2:325402}    SUBJECTIVE/OBJECTIVE:                          Jessica Tapia is a 53 year old female called for an initial visit. She was referred to me from Dr Yahir Izaguirre MD.      Reason for visit: Skyrizi (risankizumab) continuation.    Allergies/ADRs: Reviewed in chart  Past Medical History: Reviewed in chart  Tobacco: She reports that she has been smoking cigarettes. She has never used smokeless tobacco.  Alcohol: reviewed in chart      Medication Adherence/Access: no issues reported    Psoriasis vulgaris:   Current treatment:  - Skyrizi (risankizumab) 150mg subcutaneous every 12 weeks  Has been on since June 2022, Fills through [facility] program from [facility], approved through 12/5/2024.  Last Skyrizi (risankizumab) injection was end of March 2024.      - Roflumilast (Zoryve) 0.3% cream at bedtime     Side effects: no side effects .    Symptoms: large area on her abdomen that has been present for a long time and nothing has helped that area and a little bit of flaring on her fingers (ring finger and thumb) that is clearing with applying the Zoryve cream.  Overall continuing to see benefits of Skyrizi (risankizumab) with number of flares    Specialist: Dr. Yahir Izaguirre MD, Dermatology. Last visit on 5/20/2024. The following was recommended:   - Continue Skyrizi q3 months  - Due for quant gold August 2024: typically has this checked in [place], [place] at the  [facility]. Plan to fax this.   - Increase use of roflumilast 0.3% cream to daily to resistant areas    Previous treatment:   - Apremilast; clobetasol 0.05% solution (scalp), triam 01.% cream or ointment (trunk), protopic 0.1% ointment (face), prior tx: clobetasol 0.05% ointment, calcipotriene, protopic, Humira     Pre-Biologic Screening:   Hepatitis B/C screening per chart review reported to be completed at previous Dermatology office in 2020  Quantiferon TB Gold Negative (8/4/2023)      CBC (last checked 2/05/2021)  CMP (last checked 8/2/2023): eGFR >90 mL/min, liver enzymes within normal limits    Immunization History   Covid-19 vaccine (8544-8707 version)  Due to receive   Influenza (annual) Due to receive, avoid live FluMist   Pneumococcal Due to receive   Tetanus/Tdap  Up-to-date   Shingrix Due to receive   All patients on biologics should avoid live vaccines (varicella/VZV, intranasal influenza, MMR, or yellow fever vaccine (if traveling))     Patient declines vaccinations at this time    2. Hypothyroidism  2/2 to ablation from Grave's Disease  - Levothyroxine 212.5 mcg every day (200mcg tablet + 1/2 tablet of 25mcg)      Today's Vitals: There were no vitals taken for this visit.  ----------------    I spent 10 minutes with this patient today. All changes were made via collaborative practice agreement with Dr Yahir Izaguirre MD. A copy of the visit note was provided to the patient's provider(s).    A summary of these recommendations was sent via [employer].    Kenisha Osullivan, DaraD, BCPPS  Medication Therapy Management Pharmacist  [facility] Dermatology    Telemedicine Visit Details  Type of service:  Telephone visit  Start Time:  10:00 AM  End Time:  10:10 AM     Medication Therapy Recommendations  No medication therapy recommendations to display         Medication Therapy Management (MTM) Encounter    ASSESSMENT:                            Medication Adherence/Access: See below for  considerations    Psoriasis vulgaris: stable, patient continues to see great benefit in psoriasis from Skyrizi (risankizumab) and only having occasional flares that are response to topical treatment, Zoryve (roflumilast) 0.3% cream.  Patient should continue current Skyrizi (risankizumab) dosing.  We reviewed the dosing, side effects, and precautions including holding doses for illness/surgery if needed.  We also discussed how MTM can be a resource for any specialty medication access issues, including patient assistance program renewals, refill prescriptions, etc.  We also discussed that prior authorization for the Zoryve (roflumilast) 0.3% cream  today, however patient states she has gotten new insurance since filling it last and has plenty left.  Will look into co pay assistance or coupons if refill is needed and or re authorization for the Zoryve (roflumilast) 0.3% cream with new insurance. Patient also aware of yearly QFN gold that is due and will do at her PCP clinic in August with other yearly labs.    2. Hypothyroidism: stable, continue current regimen and follow up with PCP    PLAN:                             Patient to continue Skyrizi (risankizumab) 150mg subcutaneous every 12 weeks through [employer] [facility].  Refills for a year were provided by Dr Izaguirre on 2024.  Will need application renewal prior to 2024.  MTM to look into cost options for Zoryve (roflumilast) 0.3% cream and if coupon available or if prior authorization needs to be renewed    Follow-up: via MyChart as needed, and then in 6-9 months to assess safety and efficacy via telephone and ensure PAP renewal for Skyrizi.    SUBJECTIVE/OBJECTIVE:                          Jessica Tapia is a 53 year old female called for an initial visit. She was referred to me from Dr Yahir Izaguirre MD.      Reason for visit: Skyrizi (risankizumab) continuation.    Allergies/ADRs: Reviewed in chart  Past Medical History: Reviewed in  chart  Tobacco: She reports that she has been smoking cigarettes. She has never used smokeless tobacco.  Alcohol: reviewed in chart      Medication Adherence/Access: no issues reported    Psoriasis vulgaris:   Current treatment:  - Skyrizi (risankizumab) 150mg subcutaneous every 12 weeks  Has been on since June 2022, Fills through [employer] [facility] program from "[facility] pharmacy, approved through 12/5/2024.  Last Skyrizi (risankizumab) injection was end of March 2024.  Only issue has been transitioning from prior dermatologist who left practice and needing a new RX with new provider but only resulted in a few week delay.    - Roflumilast (Zoryve) 0.3% cream at bedtime     Side effects: no side effects .    Symptoms: large area on her abdomen that has been present for a long time and nothing has helped that area and a little bit of flaring on her fingers (ring finger and thumb) that is clearing with applying the Zoryve cream.  Overall continuing to see benefits of Skyrizi (risankizumab) with number of flares    Specialist: Dr. Yahir Izaguirre MD, Dermatology. Last visit on 5/20/2024. The following was recommended:   - Continue Skyrizi q3 months  - Due for quant gold August 2024: typically has this checked in [place], [place] at the [facility]. Plan to fax this.   - Increase use of roflumilast 0.3% cream to daily to resistant areas    Previous treatment:   - Apremilast; clobetasol 0.05% solution (scalp), triam 01.% cream or ointment (trunk), protopic 0.1% ointment (face), prior tx: clobetasol 0.05% ointment, calcipotriene, protopic, Humira     Pre-Biologic Screening:   Hepatitis B/C screening per chart review reported to be completed at previous Dermatology office in 2020  Quantiferon TB Gold Negative (8/4/2023)      CBC (last checked 2/05/2021)  CMP (last checked 8/2/2023): eGFR >90 mL/min, liver enzymes within normal limits    Immunization History   Covid-19 vaccine (2147-4283 version)  Due to receive    Influenza (annual) Due to receive, avoid live FluMist   Pneumococcal Due to receive   Tetanus/Tdap  Up-to-date   Shingrix Due to receive   All patients on biologics should avoid live vaccines (varicella/VZV, intranasal influenza, MMR, or yellow fever vaccine (if traveling))     Patient declines vaccinations at this time    2. Hypothyroidism  2/2 to ablation from Grave's Disease  - Levothyroxine 212.5 mcg every day (200mcg tablet + 1/2 tablet of 25mcg)      Today's Vitals: There were no vitals taken for this visit.  ----------------    I spent 10 minutes with this patient today. All changes were made via collaborative practice agreement with Dr Yahir Izaguirre MD. A copy of the visit note was provided to the patient's provider(s).    A summary of these recommendations was sent via [employer].    Kenisha Osullivan, PharmD, [facility]  Medication Therapy Management Pharmacist  [facility] Dermatology    Telemedicine Visit Details  Type of service:  Telephone visit  Start Time:  10:00 AM  End Time:  10:10 AM     Medication Therapy Recommendations  No medication therapy recommendations to display           Again, thank you for allowing me to participate in the care of your patient.      Sincerely,    Kenisha Osullivan [facility]

## 2024-05-24 NOTE — PROGRESS NOTES
Medication Therapy Management (MTM) Encounter    ASSESSMENT:                            Medication Adherence/Access: See below for considerations    Psoriasis vulgaris: stable, patient continues to see great benefit in psoriasis from Skyrizi (risankizumab) and only having occasional flares that are response to topical treatment, Zoryve (roflumilast) 0.3% cream.  Patient should continue current Skyrizi (risankizumab) dosing.  We reviewed the dosing, side effects, and precautions including holding doses for illness/surgery if needed.  We also discussed how MTM can be a resource for any specialty medication access issues, including patient assistance program renewals, refill prescriptions, etc.  We also discussed that prior authorization for the Zoryve (roflumilast) 0.3% cream  today, however patient states she has gotten new insurance since filling it last and has plenty left.  Will look into co pay assistance or coupons if refill is needed and or re authorization for the Zoryve (roflumilast) 0.3% cream with new insurance. Patient also aware of yearly QFN gold that is due and will do at her PCP clinic in August with other yearly labs.    2. Hypothyroidism: stable, continue current regimen and follow up with PCP    PLAN:                             Patient to continue Skyrizi (risankizumab) 150mg subcutaneous every 12 weeks through [facility].  Refills for a year were provided by Dr Izaguirre on 2024.  Will need application renewal prior to 2024.  MTM to look into cost options for Zoryve (roflumilast) 0.3% cream and if coupon available or if prior authorization needs to be renewed    Follow-up: via MyChart as needed, and then in 6-9 months to assess safety and efficacy via telephone and ensure PAP renewal for Skyrizi.    SUBJECTIVE/OBJECTIVE:                          Jessica Tapia is a 53 year old female called for an initial visit. She was referred to me from Dr Yahir Izaguirre MD.       Reason for visit: Skyrizi (risankizumab) continuation.    Allergies/ADRs: Reviewed in chart  Past Medical History: Reviewed in chart  Tobacco: She reports that she has been smoking cigarettes. She has never used smokeless tobacco.  Alcohol: reviewed in chart      Medication Adherence/Access: no issues reported    Psoriasis vulgaris:   Current treatment:  - Skyrizi (risankizumab) 150mg subcutaneous every 12 weeks  Has been on since June 2022, Fills through [facility] from [facility], approved through 12/5/2024.  Last Skyrizi (risankizumab) injection was end of March 2024.  Only issue has been transitioning from prior dermatologist who left practice and needing a new RX with new provider but only resulted in a few week delay.    - Roflumilast (Zoryve) 0.3% cream at bedtime     Side effects: no side effects .    Symptoms: large area on her abdomen that has been present for a long time and nothing has helped that area and a little bit of flaring on her fingers (ring finger and thumb) that is clearing with applying the Zoryve cream.  Overall continuing to see benefits of Skyrizi (risankizumab) with number of flares    Specialist: Dr. Yahir Izaguirre MD, Dermatology. Last visit on 5/20/2024. The following was recommended:   - Continue Skyrizi q3 months  - Due for quant gold August 2024: typically has this checked in [place] at the [facility] Plan to fax this.   - Increase use of roflumilast 0.3% cream to daily to resistant areas    Previous treatment:   - Apremilast; clobetasol 0.05% solution (scalp), triam 01.% cream or ointment (trunk), protopic 0.1% ointment (face), prior tx: clobetasol 0.05% ointment, calcipotriene, protopic, Humira     Pre-Biologic Screening:   Hepatitis B/C screening per chart review reported to be completed at previous Dermatology office in 2020  Quantiferon TB Gold Negative (8/4/2023)      CBC (last checked 2/05/2021)  CMP (last checked 8/2/2023): eGFR  >90 mL/min, liver enzymes within normal limits    Immunization History   Covid-19 vaccine (8548-8931 version)  Due to receive   Influenza (annual) Due to receive, avoid live FluMist   Pneumococcal Due to receive   Tetanus/Tdap  Up-to-date   Shingrix Due to receive   All patients on biologics should avoid live vaccines (varicella/VZV, intranasal influenza, MMR, or yellow fever vaccine (if traveling))     Patient declines vaccinations at this time    2. Hypothyroidism  2/2 to ablation from Grave's Disease  - Levothyroxine 212.5 mcg every day (200mcg tablet + 1/2 tablet of 25mcg)      Today's Vitals: There were no vitals taken for this visit.  ----------------    I spent 10 minutes with this patient today. All changes were made via collaborative practice agreement with Dr Yahir Izaguirre MD. A copy of the visit note was provided to the patient's provider(s).    A summary of these recommendations was sent via [employer].    Dara VargasD, BCPPS  Medication Therapy Management Pharmacist  [facility]    Telemedicine Visit Details  Type of service:  Telephone visit  Start Time:  10:00 AM  End Time:  10:10 AM     Medication Therapy Recommendations  No medication therapy recommendations to display

## 2024-06-19 ENCOUNTER — TELEPHONE (OUTPATIENT)
Dept: DERMATOLOGY | Facility: CLINIC | Age: 54
End: 2024-06-19

## 2024-10-12 ENCOUNTER — MYC MEDICAL ADVICE (OUTPATIENT)
Dept: DERMATOLOGY | Facility: CLINIC | Age: 54
End: 2024-10-12

## 2024-10-14 NOTE — TELEPHONE ENCOUNTER
Willi paperwork faxed to [phone] at pt request. Sent as of noon on 10/14    Mojgan Mishra LPN on 10/14/2024 at 12:29 PM

## 2024-10-21 ENCOUNTER — TELEPHONE (OUTPATIENT)
Dept: DERMATOLOGY | Facility: CLINIC | Age: 54
End: 2024-10-21

## 2024-10-21 NOTE — TELEPHONE ENCOUNTER
"Question about Rx Skyrizi from [facility] on [facility] in [place], [place] states \"Rx received has unclear refill quantity. Please clarify prescription with information needed\"   Tel: [phone]  Fax: [phone]    Eduardo Ramos on 10/21/2024 at 2:09 PM    "

## 2024-10-21 NOTE — TELEPHONE ENCOUNTER
Called [employer] to clarify refills - refills are good through May 2025 when patient has an appointment with Dr. Izaguirre.    Ana Lu, PharmD, MPH  Medication Therapy Management Pharmacist  [facility]

## 2024-11-02 ENCOUNTER — HEALTH MAINTENANCE LETTER (OUTPATIENT)
Age: 54
End: 2024-11-02

## 2025-05-28 ENCOUNTER — MYC MEDICAL ADVICE (OUTPATIENT)
Dept: DERMATOLOGY | Facility: CLINIC | Age: 55
End: 2025-05-28

## 2025-06-30 ENCOUNTER — TELEPHONE (OUTPATIENT)
Dept: DERMATOLOGY | Facility: CLINIC | Age: 55
End: 2025-06-30

## 2025-06-30 NOTE — TELEPHONE ENCOUNTER
MTM referral from: [place] clinic visit (referral by provider)    MTM referral outreach attempt #2 on June 30, 2025 at 2:32 PM      Outcome: Spoke with patient She does not feel that she needs this appointment    Sharmila Tai  [facility]